# Patient Record
Sex: MALE | Race: BLACK OR AFRICAN AMERICAN | URBAN - METROPOLITAN AREA
[De-identification: names, ages, dates, MRNs, and addresses within clinical notes are randomized per-mention and may not be internally consistent; named-entity substitution may affect disease eponyms.]

---

## 2017-11-29 ENCOUNTER — EMERGENCY (EMERGENCY)
Facility: HOSPITAL | Age: 47
LOS: 1 days | Discharge: HOME | End: 2017-11-29
Attending: EMERGENCY MEDICINE
Payer: COMMERCIAL

## 2017-11-29 VITALS
HEART RATE: 60 BPM | TEMPERATURE: 98 F | OXYGEN SATURATION: 100 % | SYSTOLIC BLOOD PRESSURE: 120 MMHG | RESPIRATION RATE: 18 BRPM | DIASTOLIC BLOOD PRESSURE: 75 MMHG

## 2017-11-29 VITALS
HEIGHT: 70.08 IN | SYSTOLIC BLOOD PRESSURE: 120 MMHG | HEART RATE: 73 BPM | RESPIRATION RATE: 18 BRPM | WEIGHT: 151.9 LBS | OXYGEN SATURATION: 100 % | DIASTOLIC BLOOD PRESSURE: 76 MMHG | TEMPERATURE: 98 F

## 2017-11-29 DIAGNOSIS — R07.9 CHEST PAIN, UNSPECIFIED: ICD-10-CM

## 2017-11-29 LAB
ALBUMIN SERPL ELPH-MCNC: 3.6 G/DL — SIGNIFICANT CHANGE UP (ref 3.5–5)
ALP SERPL-CCNC: 52 U/L — SIGNIFICANT CHANGE UP (ref 40–120)
ALT FLD-CCNC: 40 U/L DA — SIGNIFICANT CHANGE UP (ref 10–60)
ANION GAP SERPL CALC-SCNC: 5 MMOL/L — SIGNIFICANT CHANGE UP (ref 5–17)
APTT BLD: 33.9 SEC — SIGNIFICANT CHANGE UP (ref 27.5–37.4)
AST SERPL-CCNC: 28 U/L — SIGNIFICANT CHANGE UP (ref 10–40)
BASOPHILS # BLD AUTO: 0.1 K/UL — SIGNIFICANT CHANGE UP (ref 0–0.2)
BASOPHILS NFR BLD AUTO: 2 % — SIGNIFICANT CHANGE UP (ref 0–2)
BILIRUB SERPL-MCNC: 0.4 MG/DL — SIGNIFICANT CHANGE UP (ref 0.2–1.2)
BUN SERPL-MCNC: 13 MG/DL — SIGNIFICANT CHANGE UP (ref 7–18)
CALCIUM SERPL-MCNC: 9 MG/DL — SIGNIFICANT CHANGE UP (ref 8.4–10.5)
CHLORIDE SERPL-SCNC: 105 MMOL/L — SIGNIFICANT CHANGE UP (ref 96–108)
CK MB BLD-MCNC: 0.8 % — SIGNIFICANT CHANGE UP (ref 0–3.5)
CK MB CFR SERPL CALC: 1.4 NG/ML — SIGNIFICANT CHANGE UP (ref 0–3.6)
CK SERPL-CCNC: 179 U/L — SIGNIFICANT CHANGE UP (ref 35–232)
CO2 SERPL-SCNC: 29 MMOL/L — SIGNIFICANT CHANGE UP (ref 22–31)
CREAT SERPL-MCNC: 1.05 MG/DL — SIGNIFICANT CHANGE UP (ref 0.5–1.3)
EOSINOPHIL # BLD AUTO: 0.1 K/UL — SIGNIFICANT CHANGE UP (ref 0–0.5)
EOSINOPHIL NFR BLD AUTO: 3.6 % — SIGNIFICANT CHANGE UP (ref 0–6)
GLUCOSE SERPL-MCNC: 86 MG/DL — SIGNIFICANT CHANGE UP (ref 70–99)
HCT VFR BLD CALC: 44.9 % — SIGNIFICANT CHANGE UP (ref 39–50)
HGB BLD-MCNC: 14.4 G/DL — SIGNIFICANT CHANGE UP (ref 13–17)
INR BLD: 1.23 RATIO — HIGH (ref 0.88–1.16)
LYMPHOCYTES # BLD AUTO: 1.4 K/UL — SIGNIFICANT CHANGE UP (ref 1–3.3)
LYMPHOCYTES # BLD AUTO: 45.7 % — HIGH (ref 13–44)
MCHC RBC-ENTMCNC: 29.2 PG — SIGNIFICANT CHANGE UP (ref 27–34)
MCHC RBC-ENTMCNC: 32.1 GM/DL — SIGNIFICANT CHANGE UP (ref 32–36)
MCV RBC AUTO: 90.9 FL — SIGNIFICANT CHANGE UP (ref 80–100)
MONOCYTES # BLD AUTO: 0.4 K/UL — SIGNIFICANT CHANGE UP (ref 0–0.9)
MONOCYTES NFR BLD AUTO: 12.3 % — SIGNIFICANT CHANGE UP (ref 2–14)
NEUTROPHILS # BLD AUTO: 1.1 K/UL — LOW (ref 1.8–7.4)
NEUTROPHILS NFR BLD AUTO: 36.3 % — LOW (ref 43–77)
PLATELET # BLD AUTO: 179 K/UL — SIGNIFICANT CHANGE UP (ref 150–400)
POTASSIUM SERPL-MCNC: 4.3 MMOL/L — SIGNIFICANT CHANGE UP (ref 3.5–5.3)
POTASSIUM SERPL-SCNC: 4.3 MMOL/L — SIGNIFICANT CHANGE UP (ref 3.5–5.3)
PROT SERPL-MCNC: 7.6 G/DL — SIGNIFICANT CHANGE UP (ref 6–8.3)
PROTHROM AB SERPL-ACNC: 13.5 SEC — HIGH (ref 9.8–12.7)
RBC # BLD: 4.94 M/UL — SIGNIFICANT CHANGE UP (ref 4.2–5.8)
RBC # FLD: 12.6 % — SIGNIFICANT CHANGE UP (ref 10.3–14.5)
SODIUM SERPL-SCNC: 139 MMOL/L — SIGNIFICANT CHANGE UP (ref 135–145)
TROPONIN I SERPL-MCNC: <0.015 NG/ML — SIGNIFICANT CHANGE UP (ref 0–0.04)
TROPONIN I SERPL-MCNC: <0.015 NG/ML — SIGNIFICANT CHANGE UP (ref 0–0.04)
WBC # BLD: 3.2 K/UL — LOW (ref 3.8–10.5)
WBC # FLD AUTO: 3.2 K/UL — LOW (ref 3.8–10.5)

## 2017-11-29 PROCEDURE — 82553 CREATINE MB FRACTION: CPT

## 2017-11-29 PROCEDURE — 93005 ELECTROCARDIOGRAM TRACING: CPT

## 2017-11-29 PROCEDURE — 84484 ASSAY OF TROPONIN QUANT: CPT

## 2017-11-29 PROCEDURE — 85027 COMPLETE CBC AUTOMATED: CPT

## 2017-11-29 PROCEDURE — 99285 EMERGENCY DEPT VISIT HI MDM: CPT

## 2017-11-29 PROCEDURE — 80053 COMPREHEN METABOLIC PANEL: CPT

## 2017-11-29 PROCEDURE — 71046 X-RAY EXAM CHEST 2 VIEWS: CPT

## 2017-11-29 PROCEDURE — 71020: CPT | Mod: 26

## 2017-11-29 PROCEDURE — 82550 ASSAY OF CK (CPK): CPT

## 2017-11-29 PROCEDURE — 99283 EMERGENCY DEPT VISIT LOW MDM: CPT | Mod: 25

## 2017-11-29 PROCEDURE — 85610 PROTHROMBIN TIME: CPT

## 2017-11-29 PROCEDURE — 85730 THROMBOPLASTIN TIME PARTIAL: CPT

## 2017-11-29 RX ORDER — ASPIRIN/CALCIUM CARB/MAGNESIUM 324 MG
325 TABLET ORAL ONCE
Qty: 0 | Refills: 0 | Status: COMPLETED | OUTPATIENT
Start: 2017-11-29 | End: 2017-11-29

## 2017-11-29 RX ADMIN — Medication 325 MILLIGRAM(S): at 13:11

## 2017-11-29 NOTE — ED ADULT NURSE NOTE - OBJECTIVE STATEMENT
Pt AOx3, ambulatory, c/o chest pain since this morning. Pt denies SOB, N/V, headache, dizziness or other associated symptoms.

## 2017-11-29 NOTE — ED PROVIDER NOTE - OBJECTIVE STATEMENT
48 y/o M pt with no significant PMHx and no significant PSHx presents to the ED c/o non-radiating chest pain associated with movement, that started 2 days ago. Pt denies trauma, cough or any other complaints. NKDA.

## 2017-11-29 NOTE — ED PROVIDER NOTE - MEDICAL DECISION MAKING DETAILS
48 y/o M pt presents with chest pain. Will check delta troponin, EKG, CXR and reassess. PERC negative.

## 2017-11-29 NOTE — ED ADULT NURSE NOTE - CHPI ED SYMPTOMS NEG
no chills/no dizziness/no fever/no nausea/no syncope/no shortness of breath/no back pain/no diaphoresis/no vomiting/no chest pain/no cough

## 2019-12-01 ENCOUNTER — INPATIENT (INPATIENT)
Facility: HOSPITAL | Age: 49
LOS: 2 days | Discharge: HOME | DRG: 868 | End: 2019-12-04
Attending: STUDENT IN AN ORGANIZED HEALTH CARE EDUCATION/TRAINING PROGRAM | Admitting: STUDENT IN AN ORGANIZED HEALTH CARE EDUCATION/TRAINING PROGRAM
Payer: COMMERCIAL

## 2019-12-01 VITALS
OXYGEN SATURATION: 100 % | HEART RATE: 92 BPM | SYSTOLIC BLOOD PRESSURE: 126 MMHG | RESPIRATION RATE: 16 BRPM | DIASTOLIC BLOOD PRESSURE: 84 MMHG | HEIGHT: 68 IN | WEIGHT: 177.91 LBS | TEMPERATURE: 98 F

## 2019-12-01 LAB
ALBUMIN SERPL ELPH-MCNC: 3.1 G/DL — LOW (ref 3.5–5)
ALP SERPL-CCNC: 93 U/L — SIGNIFICANT CHANGE UP (ref 40–120)
ALT FLD-CCNC: 46 U/L DA — SIGNIFICANT CHANGE UP (ref 10–60)
ANION GAP SERPL CALC-SCNC: 6 MMOL/L — SIGNIFICANT CHANGE UP (ref 5–17)
APPEARANCE UR: CLEAR — SIGNIFICANT CHANGE UP
APTT BLD: 28.1 SEC — SIGNIFICANT CHANGE UP (ref 27.5–36.3)
AST SERPL-CCNC: 53 U/L — HIGH (ref 10–40)
BACTERIA # UR AUTO: ABNORMAL /HPF
BILIRUB SERPL-MCNC: 2.3 MG/DL — HIGH (ref 0.2–1.2)
BILIRUB UR-MCNC: ABNORMAL
BUN SERPL-MCNC: 18 MG/DL — SIGNIFICANT CHANGE UP (ref 7–18)
CALCIUM SERPL-MCNC: 8.2 MG/DL — LOW (ref 8.4–10.5)
CHLORIDE SERPL-SCNC: 101 MMOL/L — SIGNIFICANT CHANGE UP (ref 96–108)
CK SERPL-CCNC: 125 U/L — SIGNIFICANT CHANGE UP (ref 35–232)
CO2 SERPL-SCNC: 28 MMOL/L — SIGNIFICANT CHANGE UP (ref 22–31)
COLOR SPEC: ABNORMAL
CREAT SERPL-MCNC: 1.23 MG/DL — SIGNIFICANT CHANGE UP (ref 0.5–1.3)
DIFF PNL FLD: ABNORMAL
EPI CELLS # UR: ABNORMAL /HPF
FLU A RESULT: SIGNIFICANT CHANGE UP
FLU A RESULT: SIGNIFICANT CHANGE UP
FLUAV AG NPH QL: SIGNIFICANT CHANGE UP
FLUBV AG NPH QL: SIGNIFICANT CHANGE UP
GLUCOSE SERPL-MCNC: 99 MG/DL — SIGNIFICANT CHANGE UP (ref 70–99)
GLUCOSE UR QL: NEGATIVE — SIGNIFICANT CHANGE UP
INR BLD: 1.58 RATIO — HIGH (ref 0.88–1.16)
KETONES UR-MCNC: ABNORMAL
LACTATE SERPL-SCNC: 1.7 MMOL/L — SIGNIFICANT CHANGE UP (ref 0.7–2)
LEUKOCYTE ESTERASE UR-ACNC: NEGATIVE — SIGNIFICANT CHANGE UP
LIDOCAIN IGE QN: 91 U/L — SIGNIFICANT CHANGE UP (ref 73–393)
MAGNESIUM SERPL-MCNC: 1.8 MG/DL — SIGNIFICANT CHANGE UP (ref 1.6–2.6)
NITRITE UR-MCNC: NEGATIVE — SIGNIFICANT CHANGE UP
OB PNL STL: NEGATIVE — SIGNIFICANT CHANGE UP
PH UR: 5 — SIGNIFICANT CHANGE UP (ref 5–8)
POTASSIUM SERPL-MCNC: 3.6 MMOL/L — SIGNIFICANT CHANGE UP (ref 3.5–5.3)
POTASSIUM SERPL-SCNC: 3.6 MMOL/L — SIGNIFICANT CHANGE UP (ref 3.5–5.3)
PROT SERPL-MCNC: 7.1 G/DL — SIGNIFICANT CHANGE UP (ref 6–8.3)
PROT UR-MCNC: 100
PROTHROM AB SERPL-ACNC: 17.8 SEC — HIGH (ref 10–12.9)
RBC CASTS # UR COMP ASSIST: ABNORMAL /HPF (ref 0–2)
RSV RESULT: SIGNIFICANT CHANGE UP
RSV RNA RESP QL NAA+PROBE: SIGNIFICANT CHANGE UP
SODIUM SERPL-SCNC: 135 MMOL/L — SIGNIFICANT CHANGE UP (ref 135–145)
SP GR SPEC: 1.02 — SIGNIFICANT CHANGE UP (ref 1.01–1.02)
UROBILINOGEN FLD QL: 8
WBC UR QL: SIGNIFICANT CHANGE UP /HPF (ref 0–5)

## 2019-12-01 RX ORDER — METOCLOPRAMIDE HCL 10 MG
10 TABLET ORAL ONCE
Refills: 0 | Status: COMPLETED | OUTPATIENT
Start: 2019-12-01 | End: 2019-12-01

## 2019-12-01 RX ORDER — ACETAMINOPHEN 500 MG
975 TABLET ORAL ONCE
Refills: 0 | Status: COMPLETED | OUTPATIENT
Start: 2019-12-01 | End: 2019-12-01

## 2019-12-01 RX ORDER — KETOROLAC TROMETHAMINE 30 MG/ML
30 SYRINGE (ML) INJECTION ONCE
Refills: 0 | Status: DISCONTINUED | OUTPATIENT
Start: 2019-12-01 | End: 2019-12-01

## 2019-12-01 RX ORDER — SODIUM CHLORIDE 9 MG/ML
1000 INJECTION INTRAMUSCULAR; INTRAVENOUS; SUBCUTANEOUS ONCE
Refills: 0 | Status: COMPLETED | OUTPATIENT
Start: 2019-12-01 | End: 2019-12-01

## 2019-12-01 RX ADMIN — SODIUM CHLORIDE 1000 MILLILITER(S): 9 INJECTION INTRAMUSCULAR; INTRAVENOUS; SUBCUTANEOUS at 22:56

## 2019-12-01 RX ADMIN — Medication 30 MILLIGRAM(S): at 23:09

## 2019-12-01 RX ADMIN — Medication 975 MILLIGRAM(S): at 23:10

## 2019-12-01 RX ADMIN — Medication 10 MILLIGRAM(S): at 22:56

## 2019-12-01 NOTE — ED PROVIDER NOTE - PROGRESS NOTE DETAILS
Pt for reeval after IVF, toradol. pt currently on Atovaquone and Augmentin. Momo DO: MAR and Dr. Sparrow endorsed. Pt agrees with admission for fever and platelet monitoring. pt with recent outside [place] travel. I had a detailed discussion with the patient and/or guardian regarding the historical points, exam findings, and any diagnostic results supporting the admit diagnosis.

## 2019-12-01 NOTE — ED PROVIDER NOTE - SHIFT CHANGE DETAILS
Anders Harper: pt signed out to next Physician. answered all questions. PENDING: labs, reassessment of vitals & pt for final disposition

## 2019-12-01 NOTE — ED PROVIDER NOTE - CONSTITUTIONAL, MLM
normal... Tired appearing, awake, alert, oriented to person, place, time/situation and in no apparent distress.

## 2019-12-01 NOTE — ED PROVIDER NOTE - OBJECTIVE STATEMENT
50 y/o M pt with PMHx of chronic day headaches and constipation, and no significant PSHx presents to ED c/o fevers, chills, and coughs x3 days, with intermittent nausea and vomiting. Pt endorses currently having an acute onset of his chronic headache. Pt states he had recently came back from [facility] on 11/22, and was seen at an urgent care yesterday where he was given Atovaquone and Augmentin since he was bit by mosquitos while in [place]. Pt denies neck pain, blood in stool or urine, easy bruising, and any other acute complaints. 48 y/o M pt with PMHx of chronic daily headaches (w/ no definitive dx by neuro) and constipation, presents to ED c/o fevers, chills, and coughs x3 days, new onset.  with intermittent nausea and vomiting. Pt endorses currently having an acute onset of his chronic headache. Pt states he had recently came back from [facility] on 11/22, and was seen at an urgent care yesterday where he was given Atovaquone and Augmentin since he was bit by mosquitos while in [place]. Pt denies neck pain, blood in stool or urine, easy bruising, and any other acute complaints.

## 2019-12-01 NOTE — ED PROVIDER NOTE - CLINICAL SUMMARY MEDICAL DECISION MAKING FREE TEXT BOX
48 y/o M pt presents to ED with coughs, chills, and fever, likely due to viral URI versus malaria, and clinically no concerns for meningitis or ebola. Will order labs, EKG, CXR, give meds, and reassess for disposition. 48 y/o M pt presents to ED with coughs, chills, and fever, likely due to viral URI versus malaria,. clinically no concerns for meningitis or ebola. Will order labs, EKG, CXR, give meds, and reassess for disposition.

## 2019-12-02 DIAGNOSIS — R50.9 FEVER, UNSPECIFIED: ICD-10-CM

## 2019-12-02 DIAGNOSIS — D69.6 THROMBOCYTOPENIA, UNSPECIFIED: ICD-10-CM

## 2019-12-02 DIAGNOSIS — R51 HEADACHE: ICD-10-CM

## 2019-12-02 DIAGNOSIS — Z29.9 ENCOUNTER FOR PROPHYLACTIC MEASURES, UNSPECIFIED: ICD-10-CM

## 2019-12-02 LAB
24R-OH-CALCIDIOL SERPL-MCNC: 12.8 NG/ML — LOW (ref 30–80)
ALBUMIN SERPL ELPH-MCNC: 2.4 G/DL — LOW (ref 3.5–5)
ALBUMIN SERPL ELPH-MCNC: 2.5 G/DL — LOW (ref 3.5–5)
ALP SERPL-CCNC: 76 U/L — SIGNIFICANT CHANGE UP (ref 40–120)
ALP SERPL-CCNC: 81 U/L — SIGNIFICANT CHANGE UP (ref 40–120)
ALT FLD-CCNC: 33 U/L DA — SIGNIFICANT CHANGE UP (ref 10–60)
ALT FLD-CCNC: 39 U/L DA — SIGNIFICANT CHANGE UP (ref 10–60)
ANION GAP SERPL CALC-SCNC: 3 MMOL/L — LOW (ref 5–17)
ANISOCYTOSIS BLD QL: SLIGHT — SIGNIFICANT CHANGE UP
AST SERPL-CCNC: 22 U/L — SIGNIFICANT CHANGE UP (ref 10–40)
AST SERPL-CCNC: 34 U/L — SIGNIFICANT CHANGE UP (ref 10–40)
BASOPHILS # BLD AUTO: 0 K/UL — SIGNIFICANT CHANGE UP (ref 0–0.2)
BASOPHILS # BLD AUTO: SIGNIFICANT CHANGE UP K/UL (ref 0–0.2)
BASOPHILS NFR BLD AUTO: 0 % — SIGNIFICANT CHANGE UP (ref 0–2)
BASOPHILS NFR BLD AUTO: SIGNIFICANT CHANGE UP % (ref 0–2)
BILIRUB DIRECT SERPL-MCNC: 0.4 MG/DL — HIGH (ref 0–0.2)
BILIRUB DIRECT SERPL-MCNC: 0.8 MG/DL — HIGH (ref 0–0.2)
BILIRUB INDIRECT FLD-MCNC: 0.6 MG/DL — SIGNIFICANT CHANGE UP (ref 0.2–1)
BILIRUB SERPL-MCNC: 1 MG/DL — SIGNIFICANT CHANGE UP (ref 0.2–1.2)
BILIRUB SERPL-MCNC: 1.5 MG/DL — HIGH (ref 0.2–1.2)
BUN SERPL-MCNC: 22 MG/DL — HIGH (ref 7–18)
CALCIUM SERPL-MCNC: 7.5 MG/DL — LOW (ref 8.4–10.5)
CHLORIDE SERPL-SCNC: 103 MMOL/L — SIGNIFICANT CHANGE UP (ref 96–108)
CO2 SERPL-SCNC: 27 MMOL/L — SIGNIFICANT CHANGE UP (ref 22–31)
CREAT SERPL-MCNC: 1.37 MG/DL — HIGH (ref 0.5–1.3)
CULTURE RESULTS: SIGNIFICANT CHANGE UP
EOSINOPHIL # BLD AUTO: 0 K/UL — SIGNIFICANT CHANGE UP (ref 0–0.5)
EOSINOPHIL # BLD AUTO: SIGNIFICANT CHANGE UP K/UL (ref 0–0.5)
EOSINOPHIL NFR BLD AUTO: 0 % — SIGNIFICANT CHANGE UP (ref 0–6)
EOSINOPHIL NFR BLD AUTO: SIGNIFICANT CHANGE UP % (ref 0–6)
FOLATE SERPL-MCNC: 7 NG/ML — SIGNIFICANT CHANGE UP
GLUCOSE SERPL-MCNC: 117 MG/DL — HIGH (ref 70–99)
HAPTOGLOB SERPL-MCNC: <20 MG/DL — LOW (ref 34–200)
HCT VFR BLD CALC: 33.6 % — LOW (ref 39–50)
HCT VFR BLD CALC: 35.1 % — LOW (ref 39–50)
HCT VFR BLD CALC: 40.1 % — SIGNIFICANT CHANGE UP (ref 39–50)
HGB BLD-MCNC: 11.3 G/DL — LOW (ref 13–17)
HGB BLD-MCNC: 11.7 G/DL — LOW (ref 13–17)
HGB BLD-MCNC: 13.7 G/DL — SIGNIFICANT CHANGE UP (ref 13–17)
IMM GRANULOCYTES NFR BLD AUTO: SIGNIFICANT CHANGE UP % (ref 0–1.5)
LDH SERPL L TO P-CCNC: 316 U/L — HIGH (ref 120–225)
LG PLATELETS BLD QL AUTO: SLIGHT — SIGNIFICANT CHANGE UP
LYMPHOCYTES # BLD AUTO: 0.52 K/UL — LOW (ref 1–3.3)
LYMPHOCYTES # BLD AUTO: 13 % — SIGNIFICANT CHANGE UP (ref 13–44)
LYMPHOCYTES # BLD AUTO: SIGNIFICANT CHANGE UP % (ref 13–44)
LYMPHOCYTES # BLD AUTO: SIGNIFICANT CHANGE UP K/UL (ref 1–3.3)
MANUAL SMEAR VERIFICATION: SIGNIFICANT CHANGE UP
MCHC RBC-ENTMCNC: 27.2 PG — SIGNIFICANT CHANGE UP (ref 27–34)
MCHC RBC-ENTMCNC: 27.6 PG — SIGNIFICANT CHANGE UP (ref 27–34)
MCHC RBC-ENTMCNC: 27.7 PG — SIGNIFICANT CHANGE UP (ref 27–34)
MCHC RBC-ENTMCNC: 33.3 GM/DL — SIGNIFICANT CHANGE UP (ref 32–36)
MCHC RBC-ENTMCNC: 33.6 GM/DL — SIGNIFICANT CHANGE UP (ref 32–36)
MCHC RBC-ENTMCNC: 34.2 GM/DL — SIGNIFICANT CHANGE UP (ref 32–36)
MCV RBC AUTO: 81 FL — SIGNIFICANT CHANGE UP (ref 80–100)
MCV RBC AUTO: 81 FL — SIGNIFICANT CHANGE UP (ref 80–100)
MCV RBC AUTO: 82.8 FL — SIGNIFICANT CHANGE UP (ref 80–100)
METAMYELOCYTES # FLD: 3 % — HIGH (ref 0–0)
MICROCYTES BLD QL: SLIGHT — SIGNIFICANT CHANGE UP
MONOCYTES # BLD AUTO: 0.28 K/UL — SIGNIFICANT CHANGE UP (ref 0–0.9)
MONOCYTES # BLD AUTO: SIGNIFICANT CHANGE UP K/UL (ref 0–0.9)
MONOCYTES NFR BLD AUTO: 7 % — SIGNIFICANT CHANGE UP (ref 2–14)
MONOCYTES NFR BLD AUTO: SIGNIFICANT CHANGE UP % (ref 2–14)
NEUTROPHILS # BLD AUTO: 3.01 K/UL — SIGNIFICANT CHANGE UP (ref 1.8–7.4)
NEUTROPHILS # BLD AUTO: SIGNIFICANT CHANGE UP K/UL (ref 1.8–7.4)
NEUTROPHILS NFR BLD AUTO: 75 % — SIGNIFICANT CHANGE UP (ref 43–77)
NEUTROPHILS NFR BLD AUTO: SIGNIFICANT CHANGE UP % (ref 43–77)
NRBC # BLD: 0 /100 WBCS — SIGNIFICANT CHANGE UP (ref 0–0)
NRBC # BLD: 0 /100 WBCS — SIGNIFICANT CHANGE UP (ref 0–0)
NRBC # BLD: 0 /100 — SIGNIFICANT CHANGE UP (ref 0–0)
PLASMODIUM BLD SMEAR: SIGNIFICANT CHANGE UP
PLAT MORPH BLD: NORMAL — SIGNIFICANT CHANGE UP
PLATELET # BLD AUTO: 33 K/UL — LOW (ref 150–400)
PLATELET # BLD AUTO: 35 K/UL — LOW (ref 150–400)
PLATELET # BLD AUTO: 44 K/UL — LOW (ref 150–400)
PLATELET COUNT - ESTIMATE: ABNORMAL
POTASSIUM SERPL-MCNC: 3.4 MMOL/L — LOW (ref 3.5–5.3)
POTASSIUM SERPL-SCNC: 3.4 MMOL/L — LOW (ref 3.5–5.3)
PROT SERPL-MCNC: 6.1 G/DL — SIGNIFICANT CHANGE UP (ref 6–8.3)
PROT SERPL-MCNC: 6.2 G/DL — SIGNIFICANT CHANGE UP (ref 6–8.3)
RBC # BLD: 4.11 M/UL — LOW (ref 4.2–5.8)
RBC # BLD: 4.15 M/UL — LOW (ref 4.2–5.8)
RBC # BLD: 4.24 M/UL — SIGNIFICANT CHANGE UP (ref 4.2–5.8)
RBC # BLD: 4.95 M/UL — SIGNIFICANT CHANGE UP (ref 4.2–5.8)
RBC # FLD: 13.9 % — SIGNIFICANT CHANGE UP (ref 10.3–14.5)
RBC # FLD: 14.1 % — SIGNIFICANT CHANGE UP (ref 10.3–14.5)
RBC # FLD: 14.6 % — HIGH (ref 10.3–14.5)
RBC BLD AUTO: ABNORMAL
RETICS #: 30.4 K/UL — SIGNIFICANT CHANGE UP (ref 25–125)
RETICS/RBC NFR: 0.7 % — SIGNIFICANT CHANGE UP (ref 0.5–2.5)
SODIUM SERPL-SCNC: 133 MMOL/L — LOW (ref 135–145)
SPECIMEN SOURCE: SIGNIFICANT CHANGE UP
TSH SERPL-MCNC: 0.58 UU/ML — SIGNIFICANT CHANGE UP (ref 0.34–4.82)
VARIANT LYMPHS # BLD: 2 % — SIGNIFICANT CHANGE UP (ref 0–6)
VIT B12 SERPL-MCNC: 575 PG/ML — SIGNIFICANT CHANGE UP (ref 232–1245)
WBC # BLD: 3.73 K/UL — LOW (ref 3.8–10.5)
WBC # BLD: 4.01 K/UL — SIGNIFICANT CHANGE UP (ref 3.8–10.5)
WBC # BLD: 4.32 K/UL — SIGNIFICANT CHANGE UP (ref 3.8–10.5)
WBC # FLD AUTO: 3.73 K/UL — LOW (ref 3.8–10.5)
WBC # FLD AUTO: 4.01 K/UL — SIGNIFICANT CHANGE UP (ref 3.8–10.5)
WBC # FLD AUTO: 4.32 K/UL — SIGNIFICANT CHANGE UP (ref 3.8–10.5)

## 2019-12-02 PROCEDURE — 71045 X-RAY EXAM CHEST 1 VIEW: CPT | Mod: 26

## 2019-12-02 PROCEDURE — 70450 CT HEAD/BRAIN W/O DYE: CPT | Mod: 26

## 2019-12-02 PROCEDURE — 99285 EMERGENCY DEPT VISIT HI MDM: CPT

## 2019-12-02 PROCEDURE — 99222 1ST HOSP IP/OBS MODERATE 55: CPT

## 2019-12-02 RX ORDER — ATOVAQUONE/PROGUANIL HCL 250-100 MG
4 TABLET ORAL DAILY
Refills: 0 | Status: COMPLETED | OUTPATIENT
Start: 2019-12-02 | End: 2019-12-04

## 2019-12-02 RX ORDER — CEFTRIAXONE 500 MG/1
1000 INJECTION, POWDER, FOR SOLUTION INTRAMUSCULAR; INTRAVENOUS EVERY 24 HOURS
Refills: 0 | Status: DISCONTINUED | OUTPATIENT
Start: 2019-12-03 | End: 2019-12-03

## 2019-12-02 RX ORDER — CEFTRIAXONE 500 MG/1
INJECTION, POWDER, FOR SOLUTION INTRAMUSCULAR; INTRAVENOUS
Refills: 0 | Status: DISCONTINUED | OUTPATIENT
Start: 2019-12-02 | End: 2019-12-03

## 2019-12-02 RX ORDER — POTASSIUM CHLORIDE 20 MEQ
40 PACKET (EA) ORAL ONCE
Refills: 0 | Status: COMPLETED | OUTPATIENT
Start: 2019-12-02 | End: 2019-12-02

## 2019-12-02 RX ORDER — ACETAMINOPHEN 500 MG
650 TABLET ORAL ONCE
Refills: 0 | Status: COMPLETED | OUTPATIENT
Start: 2019-12-02 | End: 2019-12-02

## 2019-12-02 RX ORDER — CEFTRIAXONE 500 MG/1
1000 INJECTION, POWDER, FOR SOLUTION INTRAMUSCULAR; INTRAVENOUS ONCE
Refills: 0 | Status: COMPLETED | OUTPATIENT
Start: 2019-12-02 | End: 2019-12-02

## 2019-12-02 RX ORDER — CHOLECALCIFEROL (VITAMIN D3) 125 MCG
1000 CAPSULE ORAL DAILY
Refills: 0 | Status: DISCONTINUED | OUTPATIENT
Start: 2019-12-02 | End: 2019-12-04

## 2019-12-02 RX ORDER — SODIUM CHLORIDE 9 MG/ML
1000 INJECTION INTRAMUSCULAR; INTRAVENOUS; SUBCUTANEOUS
Refills: 0 | Status: DISCONTINUED | OUTPATIENT
Start: 2019-12-02 | End: 2019-12-04

## 2019-12-02 RX ADMIN — Medication 4 TABLET(S): at 11:37

## 2019-12-02 RX ADMIN — Medication 650 MILLIGRAM(S): at 06:19

## 2019-12-02 RX ADMIN — SODIUM CHLORIDE 125 MILLILITER(S): 9 INJECTION INTRAMUSCULAR; INTRAVENOUS; SUBCUTANEOUS at 11:37

## 2019-12-02 RX ADMIN — Medication 1000 UNIT(S): at 13:03

## 2019-12-02 RX ADMIN — Medication 40 MILLIEQUIVALENT(S): at 12:07

## 2019-12-02 RX ADMIN — SODIUM CHLORIDE 125 MILLILITER(S): 9 INJECTION INTRAMUSCULAR; INTRAVENOUS; SUBCUTANEOUS at 06:21

## 2019-12-02 RX ADMIN — CEFTRIAXONE 100 MILLIGRAM(S): 500 INJECTION, POWDER, FOR SOLUTION INTRAMUSCULAR; INTRAVENOUS at 16:41

## 2019-12-02 NOTE — DISCHARGE NOTE PROVIDER - NSDCMRMEDTOKEN_GEN_ALL_CORE_FT
Linzess 72 mcg oral capsule: cap(s) orally once a day acetaminophen 325 mg oral tablet: 2 tab(s) orally every 6 hours, As needed, Mild Pain (1 - 3), Moderate Pain (4 - 6)  docosanol 10% topical cream: 1 application topically 5 times a day  ergocalciferol 50,000 intl units (1.25 mg) oral capsule: 1 cap(s) orally once a week  Linzess 72 mcg oral capsule: cap(s) orally once a day acetaminophen 325 mg oral tablet: 2 tab(s) orally every 6 hours, As needed, Mild Pain (1 - 3), Moderate Pain (4 - 6)  atovaquone-proguanil 250 mg-100 mg oral tablet: 4 tab(s) orally once a day . Last dose Dec 5th.   docosanol 10% topical cream: 1 application topically 5 times a day  ergocalciferol 50,000 intl units (1.25 mg) oral capsule: 1 cap(s) orally once a week  Linzess 72 mcg oral capsule: cap(s) orally once a day

## 2019-12-02 NOTE — H&P ADULT - PROBLEM SELECTOR PLAN 1
P/w fever of 104.2, weakness  Labs noted with elevated LDH, indirect bili, suspect likely hemolysis  Though malarial smear negative, high suspicion for malaria  -would start atovaquone-proguanil   -monitor cbc  -f/u haptoglobin P/w fever of 104.2, weakness  Labs noted with elevated LDH, indirect bili, suspect likely hemolysis  Though malarial smear negative, high suspicion for malaria, maybe incompletely treated  -would start atovaquone-proguanil   -monitor cbc, f/u haptoglobin  -ID consult Dr Ray

## 2019-12-02 NOTE — H&P ADULT - HISTORY OF PRESENT ILLNESS
Patient is a 49y M with chronic headache, constipation, presented with fever and cough since 5 days. Patient reports high fevers at home however does not know his temperature, says it is associated with chills and weakness. He reports dry cough of the same duration. but no difficulty breathing, chest pain, palpitations or lightheadedness He denies diarrhea or abdominal pain but endorses 2 episodes of NBNB vomiting 2 days prior. Patient also endorses evening headaches, denies changes in vision, neck pain, weakness or numbness. Also denies rashes or bruising, has no dark stools or blood in stools or urine, does attribute dark color to his urine, with no associated urinary symptoms. Patient denies sick contacts, but travelled to [place] recently , returned on 11/22, reports suffering mosquito bites. He reports going to urgent care center where he was given atovaquone but has had no relief. Patient is a 49y M with chronic headache, constipation, presented with fever and cough since 5 days. Patient reports high fevers at home however does not know his temperature, says it is associated with chills and weakness. He reports dry cough of the same duration. but no difficulty breathing, chest pain, palpitations or lightheadedness He denies diarrhea or abdominal pain but endorses 2 episodes of NBNB vomiting 2 days prior. Patient also endorses evening headaches, denies changes in vision, neck pain, weakness or numbness. Also denies rashes or bruising, has no dark stools or blood in stools or urine, does attribute dark color to his urine, with no associated urinary symptoms. Patient denies sick contacts, but travelled to [place] recently , returned on 11/22, reports suffering mosquito bites. He reports going to urgent care center where he was given antimalarial but has had no relief.

## 2019-12-02 NOTE — DISCHARGE NOTE PROVIDER - PROVIDER TOKENS
FREE:[LAST:[Dr. Bill Rodriguez],FIRST:[PMD],PHONE:[(   )    -],FAX:[(   )    -],ADDRESS:[[place], [place]],FOLLOWUP:[1 week]]

## 2019-12-02 NOTE — PROVIDER CONTACT NOTE (CRITICAL VALUE NOTIFICATION) - PERSON GIVING RESULT:
Kendra Crocker, [facility] @ [facility]

## 2019-12-02 NOTE — H&P ADULT - NSHPPHYSICALEXAM_GEN_ALL_CORE
PHYSICAL EXAM:  GENERAL: NAD, well-developed  HEAD:  Atraumatic, Normocephalic  EYES: EOMI, PERRLA, conjunctiva and sclera clear  NECK: Supple, No JVD  CHEST/LUNG: Clear to auscultation bilaterally; No wheeze  HEART: Regular rate and rhythm; No murmurs, rubs, or gallops  ABDOMEN: Soft, Nontender, Nondistended; Bowel sounds present  EXTREMITIES:, No clubbing, cyanosis, or edema  PSYCH: AAOx3  NEUROLOGY: non-focal  SKIN: No rashes or lesions

## 2019-12-02 NOTE — CONSULT NOTE ADULT - SUBJECTIVE AND OBJECTIVE BOX
HPI:  Patient is a 49y M with chronic headache, constipation, presented with fever and cough since 5 days. Patient reports high fevers at home however does not know his temperature, says it is associated with chills and weakness. He reports dry cough of the same duration. but no difficulty breathing, chest pain, palpitations or lightheadedness He denies diarrhea or abdominal pain but endorses 2 episodes of NBNB vomiting 2 days prior. Patient also endorses evening headaches, denies changes in vision, neck pain, weakness or numbness. Also denies rashes or bruising, has no dark stools or blood in stools or urine, does attribute dark color to his urine, with no associated urinary symptoms. Patient denies sick contacts, but travelled to [place] recently , returned on , reports suffering mosquito bites. He reports going to urgent care center where he was given antimalarial but has had no relief. 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 pt states he started to have this symptoms while he was in [place]. pt did not seek medical attention at that time and took ibuprofen for his symptoms. pt continued to have same symptoms even after returning to [place] on 2019. Pt went to urgent care where he was diagnosed with flu and was given amoxicillin and was given medication for malaria which he does not remember at this point.   Pt came to ED as his symptoms did not improved after taking 2 day to medications. Pt states he lost his appetite but it is improving.      REVIEW OF SYSTEMS:  General: + fever, night sweats, 	  Chest: no chest pain, sob  GI: + vomiting, low appetite, no diarrhea   : no hematuria and dysuria   Skin: no rash	  Musculoskeletal: no muscle pain	  Neuro: + headache, no confusion     PAST MEDICAL & SURGICAL HISTORY:  Chronic headache  Constipation  No significant past surgical history    ALLERGIES: No Known Allergies    MEDS:  atovaquone 250 mG/proguanil 100 mG 4 Tablet(s) Oral daily  sodium chloride 0.9%. 1000 milliLiter(s) IV Continuous <Continuous>    SOCIAL HISTORY:  Smoker:  former smoker     FAMILY HISTORY:  FH: diabetes mellitus    VITALS:  Vital Signs Last 24 Hrs  T(C): 36.6 (02 Dec 2019 11:48), Max: 40.1 (01 Dec 2019 22:53)  T(F): 97.9 (02 Dec 2019 11:48), Max: 104.2 (01 Dec 2019 22:53)  HR: 83 (02 Dec 2019 11:48) (66 - 98)  BP: 105/73 (02 Dec 2019 11:48) (98/63 - 126/84)  BP(mean): --  RR: 16 (02 Dec 2019 11:48) (16 - 18)  SpO2: 100% (02 Dec 2019 11:48) (100% - 100%)      LABS/DIAGNOSTIC TESTS:                        11.3   4.32  )-----------( 35       ( 02 Dec 2019 07:02 )             33.6     WBC Count: 4.32 K/uL ( @ 07:02)  WBC Count: 4.01 K/uL ( @ 22:54)        133<L>  |  103  |  22<H>  ----------------------------<  117<H>  3.4<L>   |  27  |  1.37<H>    Ca    7.5<L>      02 Dec 2019 07:02  Mg     1.8     12    TPro  6.2  /  Alb  2.5<L>  /  TBili  1.5<H>  /  DBili  x   /  AST  34  /  ALT  39  /  AlkPhos  81  12    Urinalysis Basic - ( 01 Dec 2019 23:06 )    Color: Ak[place] / Appearance: Clear / S.020 / pH: x  Gluc: x / Ketone: Moderate  / Bili: Small / Urobili: 8   Blood: x / Protein: 100 / Nitrite: Negative   Leuk Esterase: Negative / RBC: 10-25 /HPF / WBC 0-2 /HPF   Sq Epi: x / Non Sq Epi: Occasional /HPF / Bacteria: Few /HPF      LIVER FUNCTIONS - ( 02 Dec 2019 07:02 )  Alb: 2.5 g/dL / Pro: 6.2 g/dL / ALK PHOS: 81 U/L / ALT: 39 U/L DA / AST: 34 U/L / GGT: x           PT/INR - ( 01 Dec 2019 22:54 )   PT: 17.8 sec;   INR: 1.58 ratio         PTT - ( 01 Dec 2019 22:54 )  PTT:28.1 sec  Lactate, Blood: 1.7 mmol/L ( @ 22:54)    ABG -     CULTURES:       RADIOLOGY: HPI:  Patient is a 49y M with chronic headache, constipation, presented with fever and cough since 5 days. Patient reports high fevers at home however does not know his temperature, says it is associated with chills and weakness. He reports dry cough of the same duration. but no difficulty breathing, chest pain, palpitations or lightheadedness He denies diarrhea or abdominal pain but endorses 2 episodes of NBNB vomiting 2 days prior. Patient also endorses evening headaches, denies changes in vision, neck pain, weakness or numbness. Also denies rashes or bruising, has no dark stools or blood in stools or urine, does attribute dark color to his urine, with no associated urinary symptoms. Patient denies sick contacts, but travelled to [place] recently , returned on , reports suffering mosquito bites. He reports going to urgent care center where he was given antimalarial but has had no relief. 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 pt states he started to have this symptoms while he was in [place]. pt did not seek medical attention at that time and took ibuprofen for his symptoms. pt continued to have same symptoms even after returning to [place] on [place]. Pt went to urgent care where he was diagnosed with flu and was given amoxicillin and was given medication for malaria , Malarone 4 tabs po qd which he took for 2 days but was still having high fevers.   Pt came to ED as his symptoms did not improved after taking 2 day to medications. Pt states he lost his appetite but it is improving.      REVIEW OF SYSTEMS:  General: + fever, night sweats, 	  Chest: no chest pain, sob  GI: + vomiting, low appetite, no diarrhea   : no hematuria and dysuria   Skin: no rash	  Musculoskeletal: no muscle pain	  Neuro: + headache, no confusion     PAST MEDICAL & SURGICAL HISTORY:  Chronic headache  Constipation  No significant past surgical history    ALLERGIES: No Known Allergies    MEDS:  atovaquone 250 mG/proguanil 100 mG 4 Tablet(s) Oral daily  sodium chloride 0.9%. 1000 milliLiter(s) IV Continuous <Continuous>    SOCIAL HISTORY:  Smoker:  former smoker     FAMILY HISTORY:  FH: diabetes mellitus    VITALS:  Vital Signs Last 24 Hrs  T(C): 36.6 (02 Dec 2019 11:48), Max: 40.1 (01 Dec 2019 22:53)  T(F): 97.9 (02 Dec 2019 11:48), Max: 104.2 (01 Dec 2019 22:53)  HR: 83 (02 Dec 2019 11:48) (66 - 98)  BP: 105/73 (02 Dec 2019 11:48) (98/63 - 126/84)  BP(mean): --  RR: 16 (02 Dec 2019 11:48) (16 - 18)  SpO2: 100% (02 Dec 2019 11:48) (100% - 100%)      LABS/DIAGNOSTIC TESTS:                        11.3   4.32  )-----------( 35       ( 02 Dec 2019 07:02 )             33.6     WBC Count: 4.32 K/uL ( @ 07:02)  WBC Count: 4.01 K/uL ( @ 22:54)        133<L>  |  103  |  22<H>  ----------------------------<  117<H>  3.4<L>   |  27  |  1.37<H>    Ca    7.5<L>      02 Dec 2019 07:02  Mg     1.8     12    TPro  6.2  /  Alb  2.5<L>  /  TBili  1.5<H>  /  DBili  x   /  AST  34  /  ALT  39  /  AlkPhos  81  12    Urinalysis Basic - ( 01 Dec 2019 23:06 )    Color: Akua / Appearance: Clear / S.020 / pH: x  Gluc: x / Ketone: Moderate  / Bili: Small / Urobili: 8   Blood: x / Protein: 100 / Nitrite: Negative   Leuk Esterase: Negative / RBC: 10-25 /HPF / WBC 0-2 /HPF   Sq Epi: x / Non Sq Epi: Occasional /HPF / Bacteria: Few /HPF      LIVER FUNCTIONS - ( 02 Dec 2019 07:02 )  Alb: 2.5 g/dL / Pro: 6.2 g/dL / ALK PHOS: 81 U/L / ALT: 39 U/L DA / AST: 34 U/L / GGT: x           PT/INR - ( 01 Dec 2019 22:54 )   PT: 17.8 sec;   INR: 1.58 ratio         PTT - ( 01 Dec 2019 22:54 )  PTT:28.1 sec  Lactate, Blood: 1.7 mmol/L ( @ 22:54)    ABG -     CULTURES:       RADIOLOGY:< from: Xray Chest 1 View- PORTABLE-Urgent (19 @ 01:24) >  EXAM:  XR CHEST PORTABLE URGENT 1V                            PROCEDURE DATE:  2019          INTERPRETATION:  Chest one view    HISTORY: Possible malaria    COMPARISON STUDY: None available    Rotated expiratory view of the chest shows the heart to be normal in   size. The lungs show questionable left hilar mass or adenopathy and there   is no evidence of pneumothorax nor pleural effusion.    IMPRESSION:  Questionable left hilar adenopathy. PA and lateral study is recommended   for further evaluation.    Thank you for the courtesy of this referral.      < end of copied text >

## 2019-12-02 NOTE — ED ADULT NURSE NOTE - OBJECTIVE STATEMENT
Pt BIBA with complaints of fever, head ache and vomiting.  Pt stated that he had just travel to [place] recently.

## 2019-12-02 NOTE — DISCHARGE NOTE PROVIDER - HOSPITAL COURSE
49y M with chronic headache, constipation, presented with fever and cough since 5 days. Patient reports high fevers at home however does not know his temperature, says it is associated with chills and weakness. He reports dry cough of the same duration. Patient denies sick contacts, but travelled to [place] recently , returned on 11/22, reports suffering mosquito bites. He reports going to urgent care center where he was given antimalarial but has had no relief. Malarial smear positive, pt was already started on atovaquone-proguanil. for uncomplicated malaria with some hemolysis.        US of  abdomen ? 49y M  UN   with PMH chronic headache, constipation, presented with fever and cough since 5 days. Patient reports high fevers at home however does not know his temperature, says it is associated with chills and weakness. He reports dry cough of the same duration. Patient denies sick contacts, but travelled to [place] recently , returned on 11/22, reports suffering mosquito bites. He reports going to urgent care center where he was given antimalarial but has had no relief. Malarial smear positive, pt was already started on atovaquone-proguanil for uncomplicated malaria with some hemolysis.        US of  abdomen done ?        (TO BE COMPLETED) 49y M  UN   with PMH chronic headache, constipation, presented with fever and cough since 5 days. Patient reports high fevers at home however does not know his temperature, says it is associated with chills and weakness. He reports dry cough of the same duration. Patient denies sick contacts, but travelled to [place] recently , returned on 11/22, reports suffering mosquito bites. He reports going to urgent care center where he was given antimalarial but has had no relief. Malarial smear positive, pt was already started on atovaquone-proguanil for uncomplicated malaria with some hemolysis. US of  abdomen done performed to evaluate for infection which  shows sludge without stones or biliary dilatation. Pt seen by ID Dr. Ray.     Pt cleared for discharge.         Hospital course complicated by pruritic  herpetic lesions on nares and right side of cheek for which docanosol topical was initiated.    Pt will continue with last dose of atovaquone which pt has at home for tomorrow. Follow-up with PMD. 49y M  UN   with PMH chronic headache, constipation, presented with fever and cough since 5 days. Patient reports high fevers at home however does not know his temperature, says it is associated with chills and weakness. He reports dry cough of the same duration. Patient denies sick contacts, but travelled to [place] recently , returned on 11/22, reports suffering mosquito bites. He reports going to urgent care center where he was given antimalarial but has had no relief.    During hospitalization, Malarial smear was positive, pt was already started on atovaquone-proguanil for uncomplicated malaria with some hemolysis. US of abdomen done performed to evaluate for infection which  shows sludge without stones or biliary dilatation. Pt seen by ID Dr. Ray.     Pt cleared for discharge.         Hospital course complicated by pruritic herpetic lesions on nares and right side of cheek for which docanosol topical was initiated.    Pt will continue with last dose of atovaquone which pt has at home for tomorrow. Follow-up with PMD.

## 2019-12-02 NOTE — CONSULT NOTE ADULT - GASTROINTESTINAL DETAILS
soft/no distention/nontender/bowel sounds normal/no masses palpable nontender/no rebound tenderness/soft/no masses palpable/bowel sounds normal/no rigidity/no organomegaly/no guarding/no distention

## 2019-12-02 NOTE — DISCHARGE NOTE PROVIDER - NSDCFUADDAPPT_GEN_ALL_CORE_FT
Follow-up with your primary care physician within 1 week. Call for appointment.  Have CBC rechecked at your doctor's appointment.

## 2019-12-02 NOTE — CHART NOTE - NSCHARTNOTEFT_GEN_A_CORE
EVENT:     OBJECTIVE:  Vital Signs Last 24 Hrs  T(C): 36.8 (02 Dec 2019 20:21), Max: 40.1 (01 Dec 2019 22:53)  T(F): 98.3 (02 Dec 2019 20:21), Max: 104.2 (01 Dec 2019 22:53)  HR: 78 (02 Dec 2019 20:21) (66 - 98)  BP: 107/65 (02 Dec 2019 20:21) (98/63 - 110/69)  BP(mean): --  RR: 16 (02 Dec 2019 20:21) (16 - 18)  SpO2: 100% (02 Dec 2019 20:21) (100% - 100%)    FOCUSED PHYSICAL EXAM:    LABS:                        11.7   3.73  )-----------( 44       ( 02 Dec 2019 16:46 )             35.1   CARDIAC MARKERS ( 01 Dec 2019 22:54 )  x     / x     / 125 U/L / x     / x        12-02    133<L>  |  103  |  22<H>  ----------------------------<  117<H>  3.4<L>   |  27  |  1.37<H>    Ca    7.5<L>      02 Dec 2019 07:02  Mg     1.8     12-01    TPro  6.1  /  Alb  2.4<L>  /  TBili  1.0  /  DBili  0.4<H>  /  AST  22  /  ALT  33  /  AlkPhos  76  12-02      EKG:   IMGAGING:    ASSESSMENT:  HPI:  Patient is a 49y M with chronic headache, constipation, presented with fever and cough since 5 days. Patient reports high fevers at home however does not know his temperature, says it is associated with chills and weakness. He reports dry cough of the same duration. but no difficulty breathing, chest pain, palpitations or lightheadedness He denies diarrhea or abdominal pain but endorses 2 episodes of NBNB vomiting 2 days prior. Patient also endorses evening headaches, denies changes in vision, neck pain, weakness or numbness. Also denies rashes or bruising, has no dark stools or blood in stools or urine, does attribute dark color to his urine, with no associated urinary symptoms. Patient denies sick contacts, but travelled to [place] recently , returned on 11/22, reports suffering mosquito bites. He reports going to urgent care center where he was given antimalarial but has had no relief. (02 Dec 2019 05:10)      PLAN:     FOLLOW UP / RESULT: EVENT: Culture Results:   **Please Note**: This is a Corrected Report**  Plasmodium falciparum  by Giemsa Stain  Parasitemia = <1.0%  Previously reported as:  No Blood Parasites observed by giemsa stain (12.02.19 @ 11:07)    BRIEF HPI:  OBJECTIVE:  Vital Signs Last 24 Hrs  T(C): 36.8 (02 Dec 2019 20:21), Max: 40.1 (01 Dec 2019 22:53)  T(F): 98.3 (02 Dec 2019 20:21), Max: 104.2 (01 Dec 2019 22:53)  HR: 78 (02 Dec 2019 20:21) (66 - 98)  BP: 107/65 (02 Dec 2019 20:21) (98/63 - 110/69)  BP(mean): --  RR: 16 (02 Dec 2019 20:21) (16 - 18)  SpO2: 100% (02 Dec 2019 20:21) (100% - 100%)    FOCUSED PHYSICAL EXAM:    LABS:                        11.7   3.73  )-----------( 44       ( 02 Dec 2019 16:46 )             35.1   CARDIAC MARKERS ( 01 Dec 2019 22:54 )  x     / x     / 125 U/L / x     / x        12-02    133<L>  |  103  |  22<H>  ----------------------------<  117<H>  3.4<L>   |  27  |  1.37<H>    Ca    7.5<L>      02 Dec 2019 07:02  Mg     1.8     12-01    TPro  6.1  /  Alb  2.4<L>  /  TBili  1.0  /  DBili  0.4<H>  /  AST  22  /  ALT  33  /  AlkPhos  76  12-02      EKG:   IMGAGING:    ASSESSMENT:  HPI:  Patient is a 49y M with chronic headache, constipation, presented with fever and cough since 5 days. Patient reports high fevers at home however does not know his temperature, says it is associated with chills and weakness. He reports dry cough of the same duration. but no difficulty breathing, chest pain, palpitations or lightheadedness He denies diarrhea or abdominal pain but endorses 2 episodes of NBNB vomiting 2 days prior. Patient also endorses evening headaches, denies changes in vision, neck pain, weakness or numbness. Also denies rashes or bruising, has no dark stools or blood in stools or urine, does attribute dark color to his urine, with no associated urinary symptoms. Patient denies sick contacts, but travelled to [place] recently , returned on 11/22, reports suffering mosquito bites. He reports going to urgent care center where he was given antimalarial but has had no relief. (02 Dec 2019 05:10)      PLAN:     FOLLOW UP / RESULT: EVENT: Alerted by nurse of Critical Value Notification  Culture  Results: **Please Note**: This is a Corrected Report**  Plasmodium falciparum  by Giemsa Stain  Parasitemia = <1.0%  Previously reported as:  No Blood Parasites observed by giemsa stain (12.02.19 @ 11:07)    BRIEF HPI:  49y M with chronic headache, constipation, presented with fever and cough since 5 days. Patient reports high fevers at home however does not know his temperature, says it is associated with chills and weakness. He reports dry cough of the same duration.  Patient denies sick contacts, but travelled to [place] recently , returned on 11/22, reports suffering mosquito bites. He reports going to urgent care center where he was given antimalarial but has had no relief.   Malarial smear now positive, pt was already started on atovaquone-proguanil.     OBJECTIVE:  Vital Signs Last 24 Hrs  T(C): 36.8 (02 Dec 2019 20:21), Max: 40.1 (01 Dec 2019 22:53)  T(F): 98.3 (02 Dec 2019 20:21), Max: 104.2 (01 Dec 2019 22:53)  HR: 78 (02 Dec 2019 20:21) (66 - 98)  BP: 107/65 (02 Dec 2019 20:21) (98/63 - 110/69)  BP(mean): --  RR: 16 (02 Dec 2019 20:21) (16 - 18)  SpO2: 100% (02 Dec 2019 20:21) (100% - 100%)    FOCUSED PHYSICAL EXAM:  GENERAL: NAD, well-developed  HEAD:  Atraumatic, Normocephalic  EYES: EOMI, PERRLA, conjunctiva and sclera clear  NECK: Supple, No JVD  CHEST/LUNG: Clear to auscultation bilaterally; No wheezes or rales  HEART: Regular rate and rhythm; No murmurs, rubs, or gallops  ABDOMEN: Soft, Nontender, Nondistended, Normal bowel sounds  EXTREMITIES:  2+ Peripheral Pulses, No clubbing, cyanosis, or edema  Neurological: AOx3  Skin: Warm and dry  Psychiatric: Normal affect    LABS:                        11.7   3.73  )-----------( 44       ( 02 Dec 2019 16:46 )             35.1   CARDIAC MARKERS ( 01 Dec 2019 22:54 )  x     / x     / 125 U/L / x     / x        12-02    133<L>  |  103  |  22<H>  ----------------------------<  117<H>  3.4<L>   |  27  |  1.37<H>    Ca    7.5<L>      02 Dec 2019 07:02  Mg     1.8     12-01    TPro  6.1  /  Alb  2.4<L>  /  TBili  1.0  /  DBili  0.4<H>  /  AST  22  /  ALT  33  /  AlkPhos  76  12-02    PLAN:   PROBLEM: Uncomplicated malaria with some hemolysis due to recent exposure  1. Cont atovaquone 250 mG/proguanil 100 mG 4 Tablet(s) Oral daily  2. Cont sodium chloride 0.9%. 1000 milliliter(s) (125 mL/Hr) IV Continuous <Continuous>  3. F/u ID Dr Ray  4. F/u US abd for hydronephrosis   5. CBC daily EVENT: Alerted by nurse of Critical Value Notification  Culture  Results: **Please Note**: This is a Corrected Report**  Plasmodium falciparum  by Giemsa Stain  Parasitemia = <1.0%  Previously reported as:  No Blood Parasites observed by giemsa stain (12.02.19 @ 11:07)    BRIEF HPI:  49y M with chronic headache, constipation, presented with fever and cough since 5 days. Patient reports high fevers at home however does not know his temperature, says it is associated with chills and weakness. He reports dry cough of the same duration.  Patient denies sick contacts, but travelled to [place] recently , returned on 11/22, reports suffering mosquito bites. He reports going to urgent care center where he was given antimalarial but has had no relief.   Malarial smear now positive, pt was already started on atovaquone-proguanil.     OBJECTIVE:  Vital Signs Last 24 Hrs  T(C): 36.8 (02 Dec 2019 20:21), Max: 40.1 (01 Dec 2019 22:53)  T(F): 98.3 (02 Dec 2019 20:21), Max: 104.2 (01 Dec 2019 22:53)  HR: 78 (02 Dec 2019 20:21) (66 - 98)  BP: 107/65 (02 Dec 2019 20:21) (98/63 - 110/69)  BP(mean): --  RR: 16 (02 Dec 2019 20:21) (16 - 18)  SpO2: 100% (02 Dec 2019 20:21) (100% - 100%)    FOCUSED PHYSICAL EXAM:  GENERAL: NAD, well-developed  HEAD:  Atraumatic, Normocephalic  EYES: EOMI, PERRLA, conjunctiva and sclera clear  NECK: Supple, No JVD  CHEST/LUNG: Clear to auscultation bilaterally; No wheezes or rales  HEART: Regular rate and rhythm; No murmurs, rubs, or gallops  ABDOMEN: Soft, Nontender, Nondistended, Normal bowel sounds  EXTREMITIES:  2+ Peripheral Pulses, No clubbing, cyanosis, or edema  Neurological: AOx3  Skin: Warm and dry  Psychiatric: Normal affect    LABS:                        11.7   3.73  )-----------( 44       ( 02 Dec 2019 16:46 )             35.1   CARDIAC MARKERS ( 01 Dec 2019 22:54 )  x     / x     / 125 U/L / x     / x        12-02    133<L>  |  103  |  22<H>  ----------------------------<  117<H>  3.4<L>   |  27  |  1.37<H>    Ca    7.5<L>      02 Dec 2019 07:02  Mg     1.8     12-01    TPro  6.1  /  Alb  2.4<L>  /  TBili  1.0  /  DBili  0.4<H>  /  AST  22  /  ALT  33  /  AlkPhos  76  12-02    PLAN:   PROBLEM: Uncomplicated malaria with some hemolysis due to recent exposure  1. Cont atovaquone 250 mG/proguanil 100 mG 4 Tablet(s) Oral daily  2. Cont sodium chloride 0.9%. 1000 milliliter(s) (125 mL/Hr) IV Continuous <Continuous>  3. F/u ID Dr Ray  4. F/u US abd for hydronephrosis   5. CBC daily  6. Discussed with Dr Sorto

## 2019-12-02 NOTE — H&P ADULT - PROBLEM SELECTOR PLAN 2
p/w plt count of 33, smear with large platelets  suspect likely in setting of hemolysis or splenic sequestration  f/u repeat cbc

## 2019-12-02 NOTE — DISCHARGE NOTE PROVIDER - NSDCPNSUBOBJ_GEN_ALL_CORE
Patient is a 49y old  Male who presents with a chief complaint of fever, weakness. Found to have falciparum malaria        Today    Patient was seen and examined at bedside today    Reports feeling better         REVIEW OF SYSTEMS: denies fever, chills, SOB, palpitations, chest pain, abdominal pain        MEDICATIONS  (STANDING):    docosanol 10% Cream 1 Application(s) Topical five times a day    ergocalciferol 89611 Unit(s) Oral <User Schedule>    sodium chloride 0.9%. 1000 milliLiter(s) (125 mL/Hr) IV Continuous <Continuous>        MEDICATIONS  (PRN):    acetaminophen   Tablet .. 650 milliGRAM(s) Oral every 6 hours PRN Mild Pain (1 - 3), Moderate Pain (4 - 6)            PHYSICAL EXAM:    GENERAL: NAD    NERVOUS SYSTEM:  Alert & Oriented X3, Good concentration; Motor Strength 5/5 B/L upper and lower extremities;    CHEST/LUNG: Clear to auscultation bilaterally; No rales, rhonchi, wheezing, or rubs    HEART: Regular rate and rhythm; No murmurs, rubs, or gallops    ABDOMEN: Soft, Nontender, Nondistended; Bowel sounds present    EXTREMITIES:  2+ Peripheral Pulses, No clubbing, cyanosis, or edema    SKIN: ?small blister like lesion below nostril     LABS:                            10.2     4.89  )-----------( 81       ( 04 Dec 2019 07:32 )               30.8         142  |  112<H>  |  10    ----------------------------<  89    3.6   |  27  |  0.96        Ca    7.7<L>      04 Dec 2019 07:32        Assessment and plan:    1. Falciparum Malaria     Cont Atovaquone/Proguanil for one more day     Follow up with PCP        2. Thrombocytopenia    Improving         Patient is stable to be discharged home, discharge plan discussed with patient

## 2019-12-02 NOTE — H&P ADULT - ATTENDING COMMENTS
Pt seen and examined. Discussed with Housestaff; agree with clinical assessment as above- independent findings below.    49 year old UN  with no significant medical hx - only for chronic headaches and constipation who presents with 5 days of fever, chills, nausea/vomiting /cough, myalgia/arthralgia/confusion. There is no other associated symptoms. NO sick contacts and no other family member is sick  Only significant findings was he just came back from [facility] on 19 where he spent a month. He had several mosquito bites and fell ill while there and was treated outpatient with antimalarial ( does not know the name). Did not use antimalarial ppx. He feels this is similar to prior episodes of malaria. In the ED, he had fever up to 104F    Vital Signs Last 24 Hrs  T(C): 37.6 (02 Dec 2019 02:00), Max: 40.1 (01 Dec 2019 22:53)  T(F): 99.7 (02 Dec 2019 02:00), Max: 104.2 (01 Dec 2019 22:53)  HR: 98 (02 Dec 2019 02:00) (92 - 98)  BP: 109/58 (02 Dec 2019 02:00) (109/58 - 126/84)  RR: 17 (02 Dec 2019 02:00) (16 - 17)  SpO2: 100% (02 Dec 2019 02:00) (100% - 100%)    Middle aged man, NAD, feeling slightly better but still weak- bedsheet drenched in sweat.  Neck is supple, no kernigs  not warm to touch, mild pallor but no icterus, no palpable LAD  CTA B/L; RRR S1S2 only  Soft NT ND BS +  No focal deficits, EOMI  No pedal edema    Labs                        13.7   4.01  )-----------( 33       ( 01 Dec 2019 22:54 )             40.1     Peripheral smear - clumping  repeat in EDTA absent tube         135  |  101  |  18  ----------------------------<  99  3.6   |  28  |  1.23    Ca    8.2<L>      01 Dec 2019 22:54  Mg     1.8         TPro  x   /  Alb  x   /  TBili  x   /  DBili  0.8<H>  /  AST  x   /  ALT  x   /  AlkPhos  x   12-02    Flu -ve    Urinalysis Basic - ( 01 Dec 2019 23:06 )    Color: [place] / Appearance: Clear / S.020 / pH: x  Gluc: x / Ketone: Moderate  / Bili: Small / Urobili: 8   Blood: x / Protein: 100 / Nitrite: Negative   Leuk Esterase: Negative / RBC: 10-25 /HPF / WBC 0-2 /HPF   Sq Epi: x / Non Sq Epi: Occasional /HPF / Bacteria: Few /HPF    Impression  49 year old man with hx as above with presentation likely keeping with inadequately treated malarial infection- uncomplicated  with clinical findings and supportive lab findings of some hemolysis / slight transaminases and no other findings fitting other causes. CT head to r/o other possible causes of fever and AMS   Final malaria smear result is pending.    A/P  Uncomplicated malaria with some hemolysis  Pseudothrombocytopenia    Plan  Admit to medicine  Continue oral atovaquone-proguanil  IV F hydration  repeat cbc with EDTA -ve tube  Monitor closely  ID consult

## 2019-12-02 NOTE — CONSULT NOTE ADULT - RS GEN PE MLT RESP DETAILS PC
no rhonchi/breath sounds equal/no rales/no wheezes/good air movement no rales/no wheezes/no rhonchi/clear to auscultation bilaterally/breath sounds equal/good air movement

## 2019-12-02 NOTE — CONSULT NOTE ADULT - MUSCULOSKELETAL
detailed exam no joint swelling/ROM intact/no joint erythema/no joint warmth/no calf tenderness no calf tenderness/no joint swelling/no joint erythema/no joint warmth

## 2019-12-02 NOTE — CHART NOTE - NSCHARTNOTEFT_GEN_A_CORE
Patient is a 49y old  Male who presents with a chief complaint of fever, weakness. Recent visit to [place] and h/o mosquito bite     Today  Patient was seen and examined at bedside  Reports feeling better   He is alert oriented x3 now but does not remember which hospital or Urgent care he got antimalarial from, also does not remember getting any blood work done for malaria     REVIEW OF SYSTEMS: denies  SOB, palpitations, chest pain, abdominal pain    MEDICATIONS  (STANDING):  atovaquone 250 mG/proguanil 100 mG 4 Tablet(s) Oral daily  cefTRIAXone   IVPB      cholecalciferol 1000 Unit(s) Oral daily  sodium chloride 0.9%. 1000 milliLiter(s) (125 mL/Hr) IV Continuous <Continuous>      PHYSICAL EXAM:  GENERAL: NAD,  NERVOUS SYSTEM:  Alert & Oriented X3, Good concentration; Motor Strength 5/5 B/L upper and lower extremities  CHEST/LUNG: Clear to auscultation bilaterally; No rales, rhonchi, wheezing, or rubs  HEART: Regular rate and rhythm; No murmurs, rubs, or gallops  ABDOMEN: Soft, Nontender, Nondistended; Bowel sounds present  EXTREMITIES:  2+ Peripheral Pulses, No clubbing, cyanosis, or edema  LYMPH: No lymphadenopathy noted  SKIN: No rashes or lesions    LABS:                        11.7   3.73  )-----------( 44       ( 02 Dec 2019 16:46 )             35.1     133<L>  |  103  |  22<H>  ----------------------------<  117<H>  3.4<L>   |  27  |  1.37<H>    Ca    7.5<L>      02 Dec 2019 07:02  Mg     1.8     12    TPro  6.2  /  Alb  2.5<L>  /  TBili  1.5<H>  /  DBili  x   /  AST  34  /  ALT  39  /  AlkPhos  81  12    PT/INR - ( 01 Dec 2019 22:54 )   PT: 17.8 sec;   INR: 1.58 ratio         PTT - ( 01 Dec 2019 22:54 )  PTT:28.1 sec  Urinalysis Basic - ( 01 Dec 2019 23:06 )    Color: [place] / Appearance: Clear / S.020 / pH: x  Gluc: x / Ketone: Moderate  / Bili: Small / Urobili: 8   Blood: x / Protein: 100 / Nitrite: Negative   Leuk Esterase: Negative / RBC: 10-25 /HPF / WBC 0-2 /HPF   Sq Epi: x / Non Sq Epi: Occasional /HPF / Bacteria: Few /HPF    Assessment and plan:  1. Fever   with pancytopenia, hyperbilirubinemia, AMARILIS  Elevated LDH and low haptoglobin (? hemolysis)  Differentials include malaria, viral fever, malignancy, lymphoma, HIV, disseminated TB  Started on Anti malarial  No parasites seen in the peripheral film   Will obtain US abd to r/o hepatosplenomegaly  HIV, HCV, QuantiFeron -ordered  Dengue and Chikungunya - sent ( positive h/o mosquito bite)   ID Dr Ray consulted     2. Pancytopenia   Obtain US abd   Monitor CBC daily   Hem onc consulted Dr Saba    3. AMARILIS  cont IVF  Avoid nephrotoxic medications  Follow up US abd for hydronephrosis     Regular diet, Heparin SQ for DVT prophylaxis

## 2019-12-02 NOTE — CONSULT NOTE ADULT - ASSESSMENT
Fevers  Transaminitis  Thrombocytopenia  Appears  to be partial treated Malaria (did not take malaria prophylaxis prior to going to [place])  R/O typhoid fever as he did drink tap water and eat in roadside restaurants.      Plan - cont Malarone 400/100mgs q24hrs  start Rocephin 1 gm iv q24hrs until we have blood culture results and final smear results. Fevers  Transaminitis  Thrombocytopenia  Appears  to be partially  treated Malaria (did not take malaria prophylaxis prior to going to [place])  R/O typhoid fever as he did drink tap water and eat in roadside restaurants.      Plan - cont Malarone 400/100mgs q24hrs  start Rocephin 1 gm iv q24hrs until we have blood culture results and final smear results.

## 2019-12-02 NOTE — H&P ADULT - ASSESSMENT
Patient is a 49y M with chronic headache, constipation, presented with fever and cough since 5 days. Patient reports high fevers at home however does not know his temperature, says it is associated with chills and weakness. He reports dry cough of the same duration. but no difficulty breathing, chest pain, palpitations or lightheadedness He denies diarrhea or abdominal pain but endorses 2 episodes of NBNB vomiting 2 days prior. Patient also endorses evening headaches, denies changes in vision, neck pain, weakness or numbness. Also denies rashes or bruising, has no dark stools or blood in stools or urine, does attribute dark color to his urine, with no associated urinary symptoms. Patient denies sick contacts, but travelled to [place] recently , returned on 11/22, reports suffering mosquito bites. He reports going to urgent care center where he was given atovaquone but has had no relief.   In ER, he was noted to have fever of 104.2, labs with plt count of 33, smear with large platelets, t bili 2.3. Patient is a 49y M with chronic headache, constipation, presented with fever and cough since 5 days.     In ER, he was noted to have fever of 104.2, labs with plt count of 33, smear with large platelets, t bili 2.3. Direct bili noted 1.5, , AST 53.

## 2019-12-02 NOTE — ED ADULT NURSE NOTE - NSIMPLEMENTINTERV_GEN_ALL_ED
Implemented All Fall Risk Interventions:  [place] to call system. Call bell, personal items and telephone within reach. Instruct patient to call for assistance. Room bathroom lighting operational. Non-slip footwear when patient is off stretcher. Physically safe environment: no spills, clutter or unnecessary equipment. Stretcher in lowest position, wheels locked, appropriate side rails in place. Provide visual cue, wrist band, yellow gown, etc. Monitor gait and stability. Monitor for mental status changes and reorient to person, place, and time. Review medications for side effects contributing to fall risk. Reinforce activity limits and safety measures with patient and family.

## 2019-12-02 NOTE — DISCHARGE NOTE PROVIDER - NSDCCPCAREPLAN_GEN_ALL_CORE_FT
PRINCIPAL DISCHARGE DIAGNOSIS  Diagnosis: Fever  Assessment and Plan of Treatment: Blood parasite .  due to Malaria.  Continue with last dose of Atovaquone/proguanil tomorrow.  Maintain adequate hydration,  nutrition, rest, and activity as tolerated.  Follow-up with your primary care physician within 1 week. Call for appointment.        SECONDARY DISCHARGE DIAGNOSES  Diagnosis: Facial herpetic lesions  Assessment and Plan of Treatment: Continue with ointment as prescribed .  Maintain good hygiene.  Avoid skin contact.  Follow-up with your primary care physician within 1 week. Call for appointment.      Diagnosis: Thrombocytopenia  Assessment and Plan of Treatment: Low CBC counts likely due to acute illness.  Have your CBC repeated at your doctor's office.  Follow-up with your primary care physician within 1 week. Call for appointment.      Diagnosis: Anemia  Assessment and Plan of Treatment: Symptoms to report, bleeding, palpitations, fatigue, pale skin, cold skin, dizziness. Take medications as ordered by PCP      Diagnosis: Chronic headache  Assessment and Plan of Treatment: Continue with pain regimen.  Follow-up with your primary care physician within 1 week. Call for appointment. PRINCIPAL DISCHARGE DIAGNOSIS  Diagnosis: Fever  Assessment and Plan of Treatment: Blood parasite .  due to Malaria.  Continue with last dose of Atovaquone/proguanil tomorrow.  Maintain adequate hydration,  nutrition, rest, and activity as tolerated.  Follow-up with your primary care physician within 1 week. Call for appointment.        SECONDARY DISCHARGE DIAGNOSES  Diagnosis: Vitamin D deficiency  Assessment and Plan of Treatment: Continue with Vitamin D supplement .  Check Vitamin D level in 6 to 8 weeks.   Follow-up with your primary care physician within 1 week. Call for appointment.      Diagnosis: Facial herpetic lesions  Assessment and Plan of Treatment: Continue with ointment as prescribed .  Maintain good hygiene.  Avoid skin contact.  Follow-up with your primary care physician within 1 week. Call for appointment.      Diagnosis: Thrombocytopenia  Assessment and Plan of Treatment: Low CBC counts likely due to acute illness.  Have your CBC repeated at your doctor's office.  Follow-up with your primary care physician within 1 week. Call for appointment.      Diagnosis: Anemia  Assessment and Plan of Treatment: Symptoms to report, bleeding, palpitations, fatigue, pale skin, cold skin, dizziness. Take medications as ordered by PCP      Diagnosis: Chronic headache  Assessment and Plan of Treatment: Continue with pain regimen.  Follow-up with your primary care physician within 1 week. Call for appointment. PRINCIPAL DISCHARGE DIAGNOSIS  Diagnosis: Fever  Assessment and Plan of Treatment: Blood parasite .  due to Malaria.  Continue with last dose of Atovaquone/proguanil tomorrow.  Maintain adequate hydration,  nutrition, rest, and activity as tolerated.  Follow-up with your primary care physician within 1 week. Call for appointment.        SECONDARY DISCHARGE DIAGNOSES  Diagnosis: Vitamin D deficiency  Assessment and Plan of Treatment: Continue with Vitamin D supplement .  Check Vitamin D level in 6 to 8 weeks.   Follow-up with your primary care physician within 1 week. Call for appointment.      Diagnosis: Anemia  Assessment and Plan of Treatment: Symptoms to report, bleeding, palpitations, fatigue, pale skin, cold skin, dizziness. Take medications as ordered by PCP      Diagnosis: Chronic headache  Assessment and Plan of Treatment: Continue with pain regimen.  Follow-up with your primary care physician within 1 week. Call for appointment.      Diagnosis: Facial herpetic lesions  Assessment and Plan of Treatment: Continue with ointment as prescribed .  Maintain good hygiene.  Avoid skin contact.  Follow-up with your primary care physician within 1 week. Call for appointment.      Diagnosis: Thrombocytopenia  Assessment and Plan of Treatment: Low CBC counts likely due to acute illness.  Have your CBC repeated at your doctor's office.  Follow-up with your primary care physician within 1 week. Call for appointment.

## 2019-12-03 DIAGNOSIS — D64.9 ANEMIA, UNSPECIFIED: ICD-10-CM

## 2019-12-03 DIAGNOSIS — Z71.89 OTHER SPECIFIED COUNSELING: ICD-10-CM

## 2019-12-03 DIAGNOSIS — Z02.9 ENCOUNTER FOR ADMINISTRATIVE EXAMINATIONS, UNSPECIFIED: ICD-10-CM

## 2019-12-03 DIAGNOSIS — B54 UNSPECIFIED MALARIA: ICD-10-CM

## 2019-12-03 LAB
ANION GAP SERPL CALC-SCNC: 5 MMOL/L — SIGNIFICANT CHANGE UP (ref 5–17)
BUN SERPL-MCNC: 16 MG/DL — SIGNIFICANT CHANGE UP (ref 7–18)
CALCIUM SERPL-MCNC: 7.8 MG/DL — LOW (ref 8.4–10.5)
CHLORIDE SERPL-SCNC: 107 MMOL/L — SIGNIFICANT CHANGE UP (ref 96–108)
CO2 SERPL-SCNC: 27 MMOL/L — SIGNIFICANT CHANGE UP (ref 22–31)
CREAT SERPL-MCNC: 0.92 MG/DL — SIGNIFICANT CHANGE UP (ref 0.5–1.3)
CULTURE RESULTS: NO GROWTH — SIGNIFICANT CHANGE UP
GLUCOSE SERPL-MCNC: 90 MG/DL — SIGNIFICANT CHANGE UP (ref 70–99)
HAV IGM SER-ACNC: SIGNIFICANT CHANGE UP
HBV CORE IGM SER-ACNC: SIGNIFICANT CHANGE UP
HBV SURFACE AG SER-ACNC: SIGNIFICANT CHANGE UP
HCT VFR BLD CALC: 31.8 % — LOW (ref 39–50)
HCV AB S/CO SERPL IA: 0.4 S/CO — SIGNIFICANT CHANGE UP (ref 0–0.99)
HCV AB SERPL-IMP: SIGNIFICANT CHANGE UP
HGB BLD-MCNC: 10.6 G/DL — LOW (ref 13–17)
HIV 1+2 AB+HIV1 P24 AG SERPL QL IA: SIGNIFICANT CHANGE UP
MCHC RBC-ENTMCNC: 27.8 PG — SIGNIFICANT CHANGE UP (ref 27–34)
MCHC RBC-ENTMCNC: 33.3 GM/DL — SIGNIFICANT CHANGE UP (ref 32–36)
MCV RBC AUTO: 83.5 FL — SIGNIFICANT CHANGE UP (ref 80–100)
NRBC # BLD: 0 /100 WBCS — SIGNIFICANT CHANGE UP (ref 0–0)
PLATELET # BLD AUTO: 45 K/UL — LOW (ref 150–400)
POTASSIUM SERPL-MCNC: 3.8 MMOL/L — SIGNIFICANT CHANGE UP (ref 3.5–5.3)
POTASSIUM SERPL-SCNC: 3.8 MMOL/L — SIGNIFICANT CHANGE UP (ref 3.5–5.3)
RBC # BLD: 3.81 M/UL — LOW (ref 4.2–5.8)
RBC # FLD: 14.6 % — HIGH (ref 10.3–14.5)
SODIUM SERPL-SCNC: 139 MMOL/L — SIGNIFICANT CHANGE UP (ref 135–145)
SPECIMEN SOURCE: SIGNIFICANT CHANGE UP
VIT B12 SERPL-MCNC: 555 PG/ML — SIGNIFICANT CHANGE UP (ref 232–1245)
WBC # BLD: 3.98 K/UL — SIGNIFICANT CHANGE UP (ref 3.8–10.5)
WBC # FLD AUTO: 3.98 K/UL — SIGNIFICANT CHANGE UP (ref 3.8–10.5)

## 2019-12-03 PROCEDURE — 99231 SBSQ HOSP IP/OBS SF/LOW 25: CPT

## 2019-12-03 RX ORDER — ACETAMINOPHEN 500 MG
650 TABLET ORAL EVERY 6 HOURS
Refills: 0 | Status: DISCONTINUED | OUTPATIENT
Start: 2019-12-03 | End: 2019-12-04

## 2019-12-03 RX ORDER — ERGOCALCIFEROL 1.25 MG/1
50000 CAPSULE ORAL
Refills: 0 | Status: DISCONTINUED | OUTPATIENT
Start: 2019-12-03 | End: 2019-12-04

## 2019-12-03 RX ADMIN — Medication 650 MILLIGRAM(S): at 06:59

## 2019-12-03 RX ADMIN — SODIUM CHLORIDE 125 MILLILITER(S): 9 INJECTION INTRAMUSCULAR; INTRAVENOUS; SUBCUTANEOUS at 05:10

## 2019-12-03 RX ADMIN — Medication 650 MILLIGRAM(S): at 05:09

## 2019-12-03 RX ADMIN — ERGOCALCIFEROL 50000 UNIT(S): 1.25 CAPSULE ORAL at 18:14

## 2019-12-03 RX ADMIN — Medication 1000 UNIT(S): at 13:29

## 2019-12-03 RX ADMIN — Medication 4 TABLET(S): at 13:28

## 2019-12-03 NOTE — PROGRESS NOTE ADULT - SUBJECTIVE AND OBJECTIVE BOX
49y Male    Meds:  atovaquone 250 mG/proguanil 100 mG 4 Tablet(s) Oral daily  cefTRIAXone   IVPB      cefTRIAXone   IVPB 1000 milliGRAM(s) IV Intermittent every 24 hours    Allergies    No Known Allergies    Intolerances        VITALS:  Vital Signs Last 24 Hrs  T(C): 36.6 (03 Dec 2019 04:34), Max: 36.8 (02 Dec 2019 20:21)  T(F): 97.9 (03 Dec 2019 04:34), Max: 98.3 (02 Dec 2019 20:21)  HR: 71 (03 Dec 2019 04:34) (71 - 83)  BP: 107/59 (03 Dec 2019 04:34) (107/59 - 110/69)  BP(mean): --  RR: 16 (03 Dec 2019 04:34) (16 - 17)  SpO2: 100% (03 Dec 2019 04:34) (100% - 100%)    LABS/DIAGNOSTIC TESTS:                          10.6   3.98  )-----------( 45       ( 03 Dec 2019 08:50 )             31.8         12-03    139  |  107  |  16  ----------------------------<  90  3.8   |  27  |  0.92    Ca    7.8<L>      03 Dec 2019 08:50  Mg     1.8     12-01    TPro  6.1  /  Alb  2.4<L>  /  TBili  1.0  /  DBili  0.4<H>  /  AST  22  /  ALT  33  /  AlkPhos  76  12-02      LIVER FUNCTIONS - ( 02 Dec 2019 20:22 )  Alb: 2.4 g/dL / Pro: 6.1 g/dL / ALK PHOS: 76 U/L / ALT: 33 U/L DA / AST: 22 U/L / GGT: x             CULTURES: .Urine  12-02 @ 11:17   No growth  --  --      .Blood  12-02 @ 11:07   **Please Note**: This is a Corrected Report**  Plasmodium falciparum  by Giemsa Stain  Parasitemia = <1.0%  Previously reported as:  No Blood Parasites observed by giemsa stain  --  --      .Blood  12-02 @ 11:03   No growth to date.  --  --            RADIOLOGY:      ROS:  [  ] UNABLE TO ELICIT 49y Male who is feeling much better, he has had no fevers in approx 36hrs, he still has bodyaches and headaches, his malaria smear is positive for falciparum malaria. His blood cultures are negative to date. He is looking much better as well. His platelet count is still very low.    Meds:  atovaquone 250 mG/proguanil 100 mG 4 Tablet(s) Oral daily  cefTRIAXone   IVPB      cefTRIAXone   IVPB 1000 milliGRAM(s) IV Intermittent every 24 hours    Allergies    No Known Allergies    Intolerances        VITALS:  Vital Signs Last 24 Hrs  T(C): 36.6 (03 Dec 2019 04:34), Max: 36.8 (02 Dec 2019 20:21)  T(F): 97.9 (03 Dec 2019 04:34), Max: 98.3 (02 Dec 2019 20:21)  HR: 71 (03 Dec 2019 04:34) (71 - 83)  BP: 107/59 (03 Dec 2019 04:34) (107/59 - 110/69)  BP(mean): --  RR: 16 (03 Dec 2019 04:34) (16 - 17)  SpO2: 100% (03 Dec 2019 04:34) (100% - 100%)    LABS/DIAGNOSTIC TESTS:                          10.6   3.98  )-----------( 45       ( 03 Dec 2019 08:50 )             31.8         12-03    139  |  107  |  16  ----------------------------<  90  3.8   |  27  |  0.92    Ca    7.8<L>      03 Dec 2019 08:50  Mg     1.8     12-01    TPro  6.1  /  Alb  2.4<L>  /  TBili  1.0  /  DBili  0.4<H>  /  AST  22  /  ALT  33  /  AlkPhos  76  12-02      LIVER FUNCTIONS - ( 02 Dec 2019 20:22 )  Alb: 2.4 g/dL / Pro: 6.1 g/dL / ALK PHOS: 76 U/L / ALT: 33 U/L DA / AST: 22 U/L / GGT: x             CULTURES: .Urine  12-02 @ 11:17   No growth  --  --      .Blood  12-02 @ 11:07   **Please Note**: This is a Corrected Report**  Plasmodium falciparum  by Giemsa Stain  Parasitemia = <1.0%  Previously reported as:  No Blood Parasites observed by giemsa stain  --  --      .Blood  12-02 @ 11:03   No growth to date.  --  --            RADIOLOGY:      ROS:  [  ] UNABLE TO ELICIT

## 2019-12-03 NOTE — PROGRESS NOTE ADULT - SUBJECTIVE AND OBJECTIVE BOX
HPI:  Patient is a 49y M with chronic headache, constipation, presented with fever and cough since 5 days. Patient reports high fevers at home however does not know his temperature, says it is associated with chills and weakness. He reports dry cough of the same duration. but no difficulty breathing, chest pain, palpitations or lightheadedness.  Examined at bedside, afebrile overnight, denies pain, VSS, NAD.    OVERNIGHT EVENTS:  No new overnight events     REVIEW OF SYSTEMS:    CONSTITUTIONAL: No fever,   EYES: no acute visual disturbances  NECK: No pain or stiffness  RESPIRATORY: No cough; No shortness of breath  CARDIOVASCULAR: No chest pain, no palpitations  GASTROINTESTINAL: No pain. No nausea, vomiting or diarrhea   NEUROLOGICAL: No headache or numbness, no tremors  MUSCULOSKELETAL: No joint pain, no muscle pain  GENITOURINARY: no dysuria, no frequency, no hesitancy  PSYCHIATRY: no depression , no anxiety  ALL OTHER  ROS negative        Vital Signs Last 24 Hrs  T(C): 36.6 (03 Dec 2019 04:34), Max: 36.8 (02 Dec 2019 20:21)  T(F): 97.9 (03 Dec 2019 04:34), Max: 98.3 (02 Dec 2019 20:21)  HR: 71 (03 Dec 2019 04:34) (71 - 83)  BP: 107/59 (03 Dec 2019 04:34) (107/59 - 110/69)  BP(mean): --  RR: 16 (03 Dec 2019 04:34) (16 - 17)  SpO2: 100% (03 Dec 2019 04:34) (100% - 100%)    ________________________________________________  PHYSICAL EXAM:    GENERAL: NAD  HEENT: Normocephalic; conjunctivae and sclerae clear; moist mucous membranes;   NECK : supple, no JVD  CHEST/LUNG: Clear to auscultation bilaterally   HEART: S1 S2  regular  ABDOMEN: Soft, Nontender, Nondistended; Bowel sounds present  EXTREMITIES: no cyanosis; no LE edema; no calf tenderness  SKIN: warm and dry; no rash  NERVOUS SYSTEM:  Alert & Oriented x3; no new deficits    _________________________________________________  CURRENT MEDICATIONS:    MEDICATIONS  (STANDING):  atovaquone 250 mG/proguanil 100 mG 4 Tablet(s) Oral daily  cefTRIAXone   IVPB      cefTRIAXone   IVPB 1000 milliGRAM(s) IV Intermittent every 24 hours  cholecalciferol 1000 Unit(s) Oral daily  sodium chloride 0.9%. 1000 milliLiter(s) (125 mL/Hr) IV Continuous <Continuous>    MEDICATIONS  (PRN):  acetaminophen   Tablet .. 650 milliGRAM(s) Oral every 6 hours PRN Mild Pain (1 - 3), Moderate Pain (4 - 6)      __________________________________________________  LABS:                          10.6   3.98  )-----------( 45       ( 03 Dec 2019 08:50 )             31.8     12    139  |  107  |  16  ----------------------------<  90  3.8   |  27  |  0.92    Ca    7.8<L>      03 Dec 2019 08:50  Mg     1.8     12    TPro  6.1  /  Alb  2.4<L>  /  TBili  1.0  /  DBili  0.4<H>  /  AST  22  /  ALT  33  /  AlkPhos  76  12    PT/INR - ( 01 Dec 2019 22:54 )   PT: 17.8 sec;   INR: 1.58 ratio         PTT - ( 01 Dec 2019 22:54 )  PTT:28.1 sec  Urinalysis Basic - ( 01 Dec 2019 23:06 )    Color: [place] / Appearance: Clear / S.020 / pH: x  Gluc: x / Ketone: Moderate  / Bili: Small / Urobili: 8   Blood: x / Protein: 100 / Nitrite: Negative   Leuk Esterase: Negative / RBC: 10-25 /HPF / WBC 0-2 /HPF   Sq Epi: x / Non Sq Epi: Occasional /HPF / Bacteria: Few /HPF      CAPILLARY BLOOD GLUCOSE          __________________________________________________  RADIOLOGY & ADDITIONAL TESTS:    Imaging Personally Reviewed:  YES    < from: Xray Chest 1 View- PORTABLE-Urgent (19 @ 01:24) >  COMPARISON STUDY: None available    Rotated expiratory view of the chest shows the heart to be normal in   size. The lungs show questionable left hilar mass or adenopathy and there   is no evidence of pneumothorax nor pleural effusion.    IMPRESSION:  Questionable left hilar adenopathy. PA and lateral study is recommended   for further evaluation.    < end of copied text >      < from: CT Head No Cont (19 @ 10:29) >  FINDINGS:      HEMISPHERES:  No mass or space occupying lesion.  No acute ischemic   changes or hemorrhagic foci are suggested.  VENTRICLES:  Midline and normal in size.  POSTERIOR FOSSA:  The brain stem and cerebellum are unremarkable.  No CP   angle lesion noted.  EXTRACEREBRAL SPACES:  No subdural or epidural collections are noted.  SKULL BASE AND CALVARIUM:  Appears intact.  No fracture or destructive   lesion is identified.  SINUSES AND MASTOIDS:  Clear.  MISCELLANEOUS:  No orbital or suprasellar abnormality noted.      IMPRESSION:    1)  unremarkable CT study of the brain  2)  clear sinuses and mastoids..    < end of copied text >    Consultant(s) Notes Reviewed:   YES     Plan of care was discussed with patient and /or primary care giver; all questions and concerns were addressed and care was aligned with patient's wishes.

## 2019-12-03 NOTE — PROGRESS NOTE ADULT - RS GEN PE MLT RESP DETAILS PC
no rales/no wheezes/no rhonchi/clear to auscultation bilaterally/breath sounds equal/good air movement

## 2019-12-03 NOTE — CONSULT NOTE ADULT - PROBLEM SELECTOR RECOMMENDATION 3
there are low reticulocytes, haptoglobin is low, LDH is high.  there is some hemolysis.  very few schistocytes, unlikely to be HUS or TTP  the low ret response can be due to acute illness, will monitor

## 2019-12-03 NOTE — CONSULT NOTE ADULT - ASSESSMENT
49 year old male with fever, chills, muscle ache since coming back from [facility].  Smear shiwed malaria and he is on malarone and feels much better now.  His Hb and platelets are dropping with low haptoglobin.

## 2019-12-03 NOTE — PROGRESS NOTE ADULT - PROBLEM SELECTOR PLAN 1
Pt has remained afebrile  Blood cultures NGTD  Malarial smear, Hepatitis, RSV and Flu negative  Blood Parasites +  Continue with Ceftriaxone (day 2) and antimalerial  Dr Ray (ID) following

## 2019-12-03 NOTE — PROGRESS NOTE ADULT - SUBJECTIVE AND OBJECTIVE BOX
Patient is a 49y old  Male who presents with a chief complaint of fever, weakness.     Today  Patient was seen and examined at bedside   Reports feeling better today    REVIEW OF SYSTEMS: denies fever, chills, SOB, palpitations, chest pain, abdominal pain    MEDICATIONS  (STANDING):  atovaquone 250 mG/proguanil 100 mG 4 Tablet(s) Oral daily  cholecalciferol 1000 Unit(s) Oral daily  ergocalciferol 65508 Unit(s) Oral <User Schedule>  sodium chloride 0.9%. 1000 milliLiter(s) (125 mL/Hr) IV Continuous <Continuous>    MEDICATIONS  (PRN):  acetaminophen   Tablet .. 650 milliGRAM(s) Oral every 6 hours PRN Mild Pain (1 - 3), Moderate Pain (4 - 6)    PHYSICAL EXAM:  GENERAL: NAD  NERVOUS SYSTEM:  Alert & Oriented X3, Good concentration; Motor Strength 5/5 B/L upper and lower extremities  CHEST/LUNG: Clear to auscultation bilaterally; No rales, rhonchi, wheezing, or rubs  HEART: Regular rate and rhythm; No murmurs, rubs, or gallops  ABDOMEN: Soft, Nontender, Nondistended; Bowel sounds present  EXTREMITIES:  2+ Peripheral Pulses, No clubbing, cyanosis, or edema  SKIN: No rashes or lesions    LABS:                        10.6   3.98  )-----------( 45       ( 03 Dec 2019 08:50 )             31.8     139  |  107  |  16  ----------------------------<  90  3.8   |  27  |  0.92    Ca    7.8<L>      03 Dec 2019 08:50  Mg     1.8     12-01

## 2019-12-03 NOTE — CONSULT NOTE ADULT - SUBJECTIVE AND OBJECTIVE BOX
Patient is a 49y old  Male who presents with a chief complaint of fever, weakness (02 Dec 2019 23:43)      HPI:  Patient is a 49y M with chronic headache, constipation, presented with fever and cough since 5 days. Patient reports high fevers at home however does not know his temperature, says it is associated with chills and weakness. He reports dry cough of the same duration. but no difficulty breathing, chest pain, palpitations or lightheadedness He denies diarrhea or abdominal pain but endorses 2 episodes of NBNB vomiting 2 days prior. Patient also endorses evening headaches, denies changes in vision, neck pain, weakness or numbness. Also denies rashes or bruising, has no dark stools or blood in stools or urine, does attribute dark color to his urine, with no associated urinary symptoms. Patient denies sick contacts, but travelled to [place] recently , returned on 11/22, reports suffering mosquito bites. He reports going to urgent care center where he was given antimalarial but has had no relief. (02 Dec 2019 05:10)he has headache on and off over 2 years and had MRI done which did not reveal any problem.  Blood smear showed malaria.  He is on malarone.  fever and chills resolved.       ROS:  Negative except for:    PAST MEDICAL & SURGICAL HISTORY:  Chronic headache  Constipation  No significant past surgical history      SOCIAL HISTORY:    FAMILY HISTORY:  FH: diabetes mellitus      MEDICATIONS  (STANDING):  atovaquone 250 mG/proguanil 100 mG 4 Tablet(s) Oral daily  cefTRIAXone   IVPB      cefTRIAXone   IVPB 1000 milliGRAM(s) IV Intermittent every 24 hours  cholecalciferol 1000 Unit(s) Oral daily  sodium chloride 0.9%. 1000 milliLiter(s) (125 mL/Hr) IV Continuous <Continuous>    MEDICATIONS  (PRN):  acetaminophen   Tablet .. 650 milliGRAM(s) Oral every 6 hours PRN Mild Pain (1 - 3), Moderate Pain (4 - 6)      Allergies    No Known Allergies    Intolerances        Vital Signs Last 24 Hrs  T(C): 36.6 (03 Dec 2019 04:34), Max: 36.8 (02 Dec 2019 20:21)  T(F): 97.9 (03 Dec 2019 04:34), Max: 98.3 (02 Dec 2019 20:21)  HR: 71 (03 Dec 2019 04:34) (71 - 83)  BP: 107/59 (03 Dec 2019 04:34) (105/73 - 110/69)  BP(mean): --  RR: 16 (03 Dec 2019 04:34) (16 - 17)  SpO2: 100% (03 Dec 2019 04:34) (100% - 100%)    PHYSICAL EXAM  General: adult in NAD  HEENT: clear oropharynx, anicteric sclera, pink conjunctiva  Neck: supple  CV: normal S1/S2 with no murmur rubs or gallops  Lungs: positive air movement b/l ant lungs,clear to auscultation, no wheezes, no rales  Abdomen: soft non-tender non-distended, no hepatosplenomegaly  Ext: no clubbing cyanosis or edema  Skin: no rashes and no petechiae  Neuro: alert and oriented X 4, no focal deficits      LABS:                          11.7   3.73  )-----------( 44       ( 02 Dec 2019 16:46 )             35.1         Mean Cell Volume : 82.8 fl  Mean Cell Hemoglobin : 27.6 pg  Mean Cell Hemoglobin Concentration : 33.3 gm/dL  Auto Neutrophil # : x  Auto Lymphocyte # : x  Auto Monocyte # : x  Auto Eosinophil # : x  Auto Basophil # : x  Auto Neutrophil % : x  Auto Lymphocyte % : x  Auto Monocyte % : x  Auto Eosinophil % : x  Auto Basophil % : x      Serial CBC's  12-02 @ 16:46  Hct-35.1 / Hgb-11.7 / Plat-44 / RBC-4.24 / WBC-3.73  Serial CBC's  12-02 @ 07:02  Hct-33.6 / Hgb-11.3 / Plat-35 / RBC-4.15 / WBC-4.32  Serial CBC's  12-01 @ 22:54  Hct-40.1 / Hgb-13.7 / Plat-33 / RBC-4.95 / WBC-4.01      12-02    133<L>  |  103  |  22<H>  ----------------------------<  117<H>  3.4<L>   |  27  |  1.37<H>    Ca    7.5<L>      02 Dec 2019 07:02  Mg     1.8     12-01    TPro  6.1  /  Alb  2.4<L>  /  TBili  1.0  /  DBili  0.4<H>  /  AST  22  /  ALT  33  /  AlkPhos  76  12-02      PT/INR - ( 01 Dec 2019 22:54 )   PT: 17.8 sec;   INR: 1.58 ratio         PTT - ( 01 Dec 2019 22:54 )  PTT:28.1 sec    Vitamin B12, Serum: 555 pg/mL (12-03 @ 03:06)  Folate, Serum: 7.0 ng/mL (12-02 @ 11:31)  Vitamin B12, Serum: 575 pg/mL (12-02 @ 11:31)  Reticulocyte Percent: 0.7 % (12-02 @ 07:02)              BLOOD SMEAR INTERPRETATION:   very few schistocytes seen  a lot of platelet clumping, the estimate will be more than 100,000.    RADIOLOGY & ADDITIONAL STUDIES:    < from: CT Head No Cont (12.02.19 @ 10:29) >  PROCEDURE DATE:  12/02/2019          INTERPRETATION:  INDICATION:  Confusion  TECHNIQUE:  A non contrast 2.5mm axial CT study of the brain was   performed from skull base to vertex. Coronal and sagittal reformations   were generated from the axial data.  COMPARISON EXAMINATION:  No prior    FINDINGS:      HEMISPHERES:  No mass or space occupying lesion.  No acute ischemic   changes or hemorrhagic foci are suggested.  VENTRICLES:  Midline and normal in size.  POSTERIOR FOSSA:  The brain stem and cerebellum are unremarkable.  No CP   angle lesion noted.  EXTRACEREBRAL SPACES:  No subdural or epidural collections are noted.  SKULL BASE AND CALVARIUM:  Appears intact.  No fracture or destructive   lesion is identified.  SINUSES AND MASTOIDS:  Clear.  MISCELLANEOUS:  No orbital or suprasellar abnormality noted.      IMPRESSION:    1)  unremarkable CT study of the brain  2)  clear sinuses and mastoids..      < end of copied text >  < from: Xray Chest 1 View- PORTABLE-Urgent (12.02.19 @ 01:24) >  INTERPRETATION:  Chest one view    HISTORY: Possible malaria    COMPARISON STUDY: None available    Rotated expiratory view of the chest shows the heart to be normal in   size. The lungs show questionable left hilar mass or adenopathy and there   is no evidence of pneumothorax nor pleural effusion.    IMPRESSION:  Questionable left hilar adenopathy. PA and lateral study is recommended   for further evaluation.    Thank you for the courtesy of this referral.    < end of copied text >

## 2019-12-03 NOTE — CONSULT NOTE ADULT - PROBLEM SELECTOR RECOMMENDATION 2
a lot of platelet clumping, the estimate from manual count is close to normal  there are some large ones  but the platelet count of 33 is pseudo-thrombocytopenia

## 2019-12-04 VITALS
DIASTOLIC BLOOD PRESSURE: 66 MMHG | HEART RATE: 75 BPM | SYSTOLIC BLOOD PRESSURE: 120 MMHG | OXYGEN SATURATION: 100 % | TEMPERATURE: 99 F | RESPIRATION RATE: 16 BRPM

## 2019-12-04 LAB
ANION GAP SERPL CALC-SCNC: 3 MMOL/L — LOW (ref 5–17)
BUN SERPL-MCNC: 10 MG/DL — SIGNIFICANT CHANGE UP (ref 7–18)
CALCIUM SERPL-MCNC: 7.7 MG/DL — LOW (ref 8.4–10.5)
CHLORIDE SERPL-SCNC: 112 MMOL/L — HIGH (ref 96–108)
CO2 SERPL-SCNC: 27 MMOL/L — SIGNIFICANT CHANGE UP (ref 22–31)
CREAT SERPL-MCNC: 0.96 MG/DL — SIGNIFICANT CHANGE UP (ref 0.5–1.3)
GAMMA INTERFERON BACKGROUND BLD IA-ACNC: 0.06 IU/ML — SIGNIFICANT CHANGE UP
GLUCOSE SERPL-MCNC: 89 MG/DL — SIGNIFICANT CHANGE UP (ref 70–99)
HCT VFR BLD CALC: 30.8 % — LOW (ref 39–50)
HGB BLD-MCNC: 10.2 G/DL — LOW (ref 13–17)
M TB IFN-G BLD-IMP: NEGATIVE — SIGNIFICANT CHANGE UP
M TB IFN-G CD4+ BCKGRND COR BLD-ACNC: 0.02 IU/ML — SIGNIFICANT CHANGE UP
M TB IFN-G CD4+CD8+ BCKGRND COR BLD-ACNC: 0 IU/ML — SIGNIFICANT CHANGE UP
MCHC RBC-ENTMCNC: 27.6 PG — SIGNIFICANT CHANGE UP (ref 27–34)
MCHC RBC-ENTMCNC: 33.1 GM/DL — SIGNIFICANT CHANGE UP (ref 32–36)
MCV RBC AUTO: 83.5 FL — SIGNIFICANT CHANGE UP (ref 80–100)
NRBC # BLD: 0 /100 WBCS — SIGNIFICANT CHANGE UP (ref 0–0)
PLATELET # BLD AUTO: 81 K/UL — LOW (ref 150–400)
POTASSIUM SERPL-MCNC: 3.6 MMOL/L — SIGNIFICANT CHANGE UP (ref 3.5–5.3)
POTASSIUM SERPL-SCNC: 3.6 MMOL/L — SIGNIFICANT CHANGE UP (ref 3.5–5.3)
QUANT TB PLUS MITOGEN MINUS NIL: 5.39 IU/ML — SIGNIFICANT CHANGE UP
RBC # BLD: 3.69 M/UL — LOW (ref 4.2–5.8)
RBC # FLD: 14.8 % — HIGH (ref 10.3–14.5)
SODIUM SERPL-SCNC: 142 MMOL/L — SIGNIFICANT CHANGE UP (ref 135–145)
WBC # BLD: 4.89 K/UL — SIGNIFICANT CHANGE UP (ref 3.8–10.5)
WBC # FLD AUTO: 4.89 K/UL — SIGNIFICANT CHANGE UP (ref 3.8–10.5)

## 2019-12-04 PROCEDURE — 99285 EMERGENCY DEPT VISIT HI MDM: CPT | Mod: 25

## 2019-12-04 PROCEDURE — 85610 PROTHROMBIN TIME: CPT

## 2019-12-04 PROCEDURE — 76700 US EXAM ABDOM COMPLETE: CPT

## 2019-12-04 PROCEDURE — 82550 ASSAY OF CK (CPK): CPT

## 2019-12-04 PROCEDURE — 80076 HEPATIC FUNCTION PANEL: CPT

## 2019-12-04 PROCEDURE — 86790 VIRUS ANTIBODY NOS: CPT

## 2019-12-04 PROCEDURE — 86480 TB TEST CELL IMMUN MEASURE: CPT

## 2019-12-04 PROCEDURE — 76700 US EXAM ABDOM COMPLETE: CPT | Mod: 26

## 2019-12-04 PROCEDURE — 87631 RESP VIRUS 3-5 TARGETS: CPT

## 2019-12-04 PROCEDURE — 81001 URINALYSIS AUTO W/SCOPE: CPT

## 2019-12-04 PROCEDURE — 71045 X-RAY EXAM CHEST 1 VIEW: CPT

## 2019-12-04 PROCEDURE — 82272 OCCULT BLD FECES 1-3 TESTS: CPT

## 2019-12-04 PROCEDURE — 83735 ASSAY OF MAGNESIUM: CPT

## 2019-12-04 PROCEDURE — 93005 ELECTROCARDIOGRAM TRACING: CPT

## 2019-12-04 PROCEDURE — 96375 TX/PRO/DX INJ NEW DRUG ADDON: CPT

## 2019-12-04 PROCEDURE — 82248 BILIRUBIN DIRECT: CPT

## 2019-12-04 PROCEDURE — 80074 ACUTE HEPATITIS PANEL: CPT

## 2019-12-04 PROCEDURE — 80053 COMPREHEN METABOLIC PANEL: CPT

## 2019-12-04 PROCEDURE — 83690 ASSAY OF LIPASE: CPT

## 2019-12-04 PROCEDURE — 80048 BASIC METABOLIC PNL TOTAL CA: CPT

## 2019-12-04 PROCEDURE — 96374 THER/PROPH/DIAG INJ IV PUSH: CPT

## 2019-12-04 PROCEDURE — 36415 COLL VENOUS BLD VENIPUNCTURE: CPT

## 2019-12-04 PROCEDURE — 82607 VITAMIN B-12: CPT

## 2019-12-04 PROCEDURE — 82746 ASSAY OF FOLIC ACID SERUM: CPT

## 2019-12-04 PROCEDURE — 85045 AUTOMATED RETICULOCYTE COUNT: CPT

## 2019-12-04 PROCEDURE — 85027 COMPLETE CBC AUTOMATED: CPT

## 2019-12-04 PROCEDURE — 83010 ASSAY OF HAPTOGLOBIN QUANT: CPT

## 2019-12-04 PROCEDURE — 84443 ASSAY THYROID STIM HORMONE: CPT

## 2019-12-04 PROCEDURE — 82306 VITAMIN D 25 HYDROXY: CPT

## 2019-12-04 PROCEDURE — 85730 THROMBOPLASTIN TIME PARTIAL: CPT

## 2019-12-04 PROCEDURE — 83615 LACTATE (LD) (LDH) ENZYME: CPT

## 2019-12-04 PROCEDURE — 87389 HIV-1 AG W/HIV-1&-2 AB AG IA: CPT

## 2019-12-04 PROCEDURE — 70450 CT HEAD/BRAIN W/O DYE: CPT

## 2019-12-04 PROCEDURE — 83605 ASSAY OF LACTIC ACID: CPT

## 2019-12-04 PROCEDURE — 87207 SMEAR SPECIAL STAIN: CPT

## 2019-12-04 PROCEDURE — 87086 URINE CULTURE/COLONY COUNT: CPT

## 2019-12-04 PROCEDURE — 87040 BLOOD CULTURE FOR BACTERIA: CPT

## 2019-12-04 RX ORDER — ERGOCALCIFEROL 1.25 MG/1
1 CAPSULE ORAL
Qty: 4 | Refills: 0
Start: 2019-12-04 | End: 2020-01-02

## 2019-12-04 RX ORDER — DOCOSANOL 100 MG/G
1 CREAM TOPICAL
Refills: 0 | Status: DISCONTINUED | OUTPATIENT
Start: 2019-12-04 | End: 2019-12-04

## 2019-12-04 RX ORDER — ACETAMINOPHEN 500 MG
2 TABLET ORAL
Qty: 0 | Refills: 0 | DISCHARGE
Start: 2019-12-04

## 2019-12-04 RX ORDER — DOCOSANOL 100 MG/G
1 CREAM TOPICAL
Qty: 1 | Refills: 0
Start: 2019-12-04 | End: 2019-12-08

## 2019-12-04 RX ADMIN — Medication 4 TABLET(S): at 11:46

## 2019-12-04 RX ADMIN — DOCOSANOL 1 APPLICATION(S): 100 CREAM TOPICAL at 11:46

## 2019-12-04 RX ADMIN — DOCOSANOL 1 APPLICATION(S): 100 CREAM TOPICAL at 17:24

## 2019-12-04 NOTE — PROGRESS NOTE ADULT - GASTROINTESTINAL DETAILS
soft/no distention/nontender/no masses palpable/bowel sounds normal no rigidity/nontender/no distention/no guarding/soft/no masses palpable/bowel sounds normal/no organomegaly

## 2019-12-04 NOTE — PROGRESS NOTE ADULT - RS GEN PE MLT RESP DETAILS PC
no wheezes/no rhonchi/no rales/good air movement good air movement/no wheezes/no rhonchi/clear to auscultation bilaterally/no rales

## 2019-12-04 NOTE — DISCHARGE NOTE NURSING/CASE MANAGEMENT/SOCIAL WORK - PATIENT PORTAL LINK FT
You can access the FollowMyHealth Patient Portal offered by [facility] by registering at the following website: [email or web address]. By joining [facility]’s FollowMyHealth portal, you will also be able to view your health information using other applications (apps) compatible with our system.

## 2019-12-04 NOTE — PROGRESS NOTE ADULT - ASSESSMENT
Assessment and plan:  1. Falciparum malaria   Cont Atovaquone/ Proguanil   ID consult appreciated     2. Pancytopenia   Obtain US abd   Monitor CBC daily     3. AMARILIS  Improved     Supportive:   Regular diet, Heparin SQ for DVT prophylaxis.
Falciparum Malaria - improving  fevers - improving  Thrombocytopenia - improving  blood cultures negative and so typhoid is unlikely    Plan - Cont Malarone 400 / 100mgs po q24hrs  DC Rocephin  awaiting U/S.
Plasmodium Falciparum Malaria - pt improving on treatment  Fevers resolved  Thrombocytopenia - improving    Plan - Can DC home today  He has one more dose of Malarone at home which he can take tomorrow to complete his treatment.

## 2019-12-04 NOTE — PROGRESS NOTE ADULT - SUBJECTIVE AND OBJECTIVE BOX
49y Male being admitted for malaria    subjective: pt is feeling better, no fever, ucx: neg blood culture neg to date, bcx positive for p. falciparum, thrombocytopenia improving      Meds:  atovaquone 250 mG/proguanil 100 mG 4 Tablet(s) Oral daily    Allergies    No Known Allergies    Intolerances    REVIEW OF SYSTEMS:    CONSTITUTIONAL: No weakness, fevers or chills  EYES/ENT: No visual changes;  No vertigo or throat pain   NECK: No pain or stiffness  RESPIRATORY: No cough, wheezing, hemoptysis; No shortness of breath  CARDIOVASCULAR: No chest pain or palpitations  GASTROINTESTINAL: No abdominal or epigastric pain. No nausea, vomiting, or hematemesis; No diarrhea or constipation. No melena or hematochezia.  GENITOURINARY: No dysuria, frequency or hematuria  NEUROLOGICAL: No numbness or weakness  SKIN: No itching, burning, rashes, or lesions   All other review of systems is negative unless indicated above.        VITALS:  Vital Signs Last 24 Hrs  T(C): 36.7 (04 Dec 2019 05:19), Max: 36.7 (04 Dec 2019 05:19)  T(F): 98 (04 Dec 2019 05:19), Max: 98 (04 Dec 2019 05:19)  HR: 71 (04 Dec 2019 05:19) (71 - 79)  BP: 110/66 (04 Dec 2019 05:19) (110/63 - 112/72)  BP(mean): --  RR: 16 (04 Dec 2019 05:19) (16 - 16)  SpO2: 100% (04 Dec 2019 05:19) (99% - 100%)    LABS/DIAGNOSTIC TESTS:                          10.2   4.89  )-----------( 81       ( 04 Dec 2019 07:32 )             30.8         12-04    142  |  112<H>  |  10  ----------------------------<  89  3.6   |  27  |  0.96    Ca    7.7<L>      04 Dec 2019 07:32    TPro  6.1  /  Alb  2.4<L>  /  TBili  1.0  /  DBili  0.4<H>  /  AST  22  /  ALT  33  /  AlkPhos  76  12-02      LIVER FUNCTIONS - ( 02 Dec 2019 20:22 )  Alb: 2.4 g/dL / Pro: 6.1 g/dL / ALK PHOS: 76 U/L / ALT: 33 U/L DA / AST: 22 U/L / GGT: x             CULTURES: .Urine  12-02 @ 11:17   No growth  --  --      .Blood  12-02 @ 11:07   **Please Note**: This is a Corrected Report**  Plasmodium falciparum  by Gi[facility]a Stain  Parasitemia = <1.0%  Previously reported as:  No Blood Parasites observed by giemsa stain  --  --      .Blood  12-02 @ 11:03   No growth to date.  --  --            RADIOLOGY:      ROS:  [  ] UNABLE TO ELICIT 49y Male being treated for malaria.    subjective: pt is feeling better, no fever, ucx: neg blood culture neg to date, bcx positive for p. falciparum, thrombocytopenia improving, pts u/s of abdomen is negative. He has no symptoms except for headaches which are chronic and he has a h/o migraines.      Meds:  atovaquone 250 mG/proguanil 100 mG 4 Tablet(s) Oral daily    Allergies    No Known Allergies    Intolerances    REVIEW OF SYSTEMS:    CONSTITUTIONAL: No weakness, fevers or chills  EYES/ENT: No visual changes;  No vertigo or throat pain   NECK: No pain or stiffness  RESPIRATORY: No cough, wheezing, hemoptysis; No shortness of breath  CARDIOVASCULAR: No chest pain or palpitations  GASTROINTESTINAL: No abdominal or epigastric pain. No nausea, vomiting, or hematemesis; No diarrhea or constipation. No melena or hematochezia.  GENITOURINARY: No dysuria, frequency or hematuria  NEUROLOGICAL: No numbness or weakness  SKIN: No itching, burning, rashes, or lesions   All other review of systems is negative unless indicated above.        VITALS:  Vital Signs Last 24 Hrs  T(C): 36.7 (04 Dec 2019 05:19), Max: 36.7 (04 Dec 2019 05:19)  T(F): 98 (04 Dec 2019 05:19), Max: 98 (04 Dec 2019 05:19)  HR: 71 (04 Dec 2019 05:19) (71 - 79)  BP: 110/66 (04 Dec 2019 05:19) (110/63 - 112/72)  BP(mean): --  RR: 16 (04 Dec 2019 05:19) (16 - 16)  SpO2: 100% (04 Dec 2019 05:19) (99% - 100%)    LABS/DIAGNOSTIC TESTS:                          10.2   4.89  )-----------( 81       ( 04 Dec 2019 07:32 )             30.8         12-04    142  |  112<H>  |  10  ----------------------------<  89  3.6   |  27  |  0.96    Ca    7.7<L>      04 Dec 2019 07:32    TPro  6.1  /  Alb  2.4<L>  /  TBili  1.0  /  DBili  0.4<H>  /  AST  22  /  ALT  33  /  AlkPhos  76  12-02      LIVER FUNCTIONS - ( 02 Dec 2019 20:22 )  Alb: 2.4 g/dL / Pro: 6.1 g/dL / ALK PHOS: 76 U/L / ALT: 33 U/L DA / AST: 22 U/L / GGT: x             CULTURES: .Urine  12-02 @ 11:17   No growth  --  --      .Blood  12-02 @ 11:07   **Please Note**: This is a Corrected Report**  Plasmodium falciparum  by Giemsa Stain  Parasitemia = <1.0%  Previously reported as:  No Blood Parasites observed by giemsa stain  --  --      .Blood  12-02 @ 11:03   No growth to date.  --  --            RADIOLOGY:      ROS:  [  ] UNABLE TO ELICIT

## 2019-12-05 RX ORDER — LINACLOTIDE 145 UG/1
0 CAPSULE, GELATIN COATED ORAL
Qty: 0 | Refills: 0 | DISCHARGE

## 2019-12-05 RX ORDER — ATOVAQUONE/PROGUANIL HCL 250-100 MG
4 TABLET ORAL
Qty: 0 | Refills: 0 | DISCHARGE

## 2019-12-06 LAB
CHIKUNGUNYA AB IGG IGM W/REFLEX RESULT: SIGNIFICANT CHANGE UP
CHIKV AB TITR SER: SIGNIFICANT CHANGE UP

## 2019-12-07 LAB
CULTURE RESULTS: SIGNIFICANT CHANGE UP
CULTURE RESULTS: SIGNIFICANT CHANGE UP
DENV IGG+IGM PNL SER IA: 3.66 ISR — HIGH
DENV IGM SER-ACNC: 1.1 ISR — SIGNIFICANT CHANGE UP
SPECIMEN SOURCE: SIGNIFICANT CHANGE UP
SPECIMEN SOURCE: SIGNIFICANT CHANGE UP

## 2021-09-14 NOTE — PROGRESS NOTE ADULT - GASTROINTESTINAL DETAILS
Received note from patient     Dr Zendejas  My Last physical exam was February 25, 2021  Please fax [facility] [address], [place], [place]  Fax number [phone] with :  Generic order to repair/place 17 year old straps on molded AFOs.     Include:    Name: Aishwarya Allen  Address: [address] [place] [address] [place] [address]  Phone [phone]  Thank you very much.   Aishwarya Allen     soft/nontender/no guarding/bowel sounds normal/no masses palpable/no organomegaly/no rigidity/no distention
